# Patient Record
Sex: MALE | Race: WHITE | Employment: UNEMPLOYED | URBAN - METROPOLITAN AREA
[De-identification: names, ages, dates, MRNs, and addresses within clinical notes are randomized per-mention and may not be internally consistent; named-entity substitution may affect disease eponyms.]

---

## 2020-01-22 ENCOUNTER — OFFICE VISIT (OUTPATIENT)
Dept: FAMILY MEDICINE | Facility: CLINIC | Age: 20
End: 2020-01-22

## 2020-01-22 VITALS
WEIGHT: 133.4 LBS | HEIGHT: 65 IN | HEART RATE: 106 BPM | SYSTOLIC BLOOD PRESSURE: 110 MMHG | DIASTOLIC BLOOD PRESSURE: 80 MMHG | BODY MASS INDEX: 22.23 KG/M2 | OXYGEN SATURATION: 95 % | RESPIRATION RATE: 16 BRPM | TEMPERATURE: 99 F

## 2020-01-22 DIAGNOSIS — F32.A DEPRESSION, UNSPECIFIED DEPRESSION TYPE: ICD-10-CM

## 2020-01-22 DIAGNOSIS — F41.9 ANXIETY: ICD-10-CM

## 2020-01-22 DIAGNOSIS — F43.10 PTSD (POST-TRAUMATIC STRESS DISORDER): ICD-10-CM

## 2020-01-22 DIAGNOSIS — F64.9 GENDER DYSPHORIA: Primary | ICD-10-CM

## 2020-01-22 LAB
ALBUMIN SERPL-MCNC: 4.8 MG/DL (ref 3.8–5)
ALP SERPL-CCNC: 89.7 U/L (ref 31.7–110.5)
ALT SERPL-CCNC: 12.6 U/L (ref 0–45)
AST SERPL-CCNC: 16.9 U/L (ref 0–35)
BILIRUB SERPL-MCNC: 0.6 MG/DL (ref 0.2–1.3)
BUN SERPL-MCNC: 18 MG/DL (ref 7–19)
CALCIUM SERPL-MCNC: 9.8 MG/DL (ref 8.5–10.1)
CHLORIDE SERPLBLD-SCNC: 101 MMOL/L (ref 94–109)
CHOLEST SERPL-MCNC: 124.7 MG/DL (ref 0–200)
CHOLEST/HDLC SERPL: 3 {RATIO} (ref 0–5)
CO2 SERPL-SCNC: 31.5 MMOL/L (ref 20–32)
CREAT SERPL-MCNC: 1.3 MG/DL (ref 0.5–1)
GFR SERPL CREATININE-BSD FRML MDRD: 57.4 ML/MIN/1.7 M2
GLUCOSE SERPL-MCNC: 70.2 MG'DL (ref 70–99)
HDLC SERPL-MCNC: 41.2 MG/DL
HEMOGLOBIN: 15 G/DL (ref 11.7–15.7)
LDLC SERPL CALC-MCNC: 57 MG/DL (ref 0–129)
POTASSIUM SERPL-SCNC: 3.9 MMOL/DL (ref 3.3–4.5)
PROT SERPL-MCNC: 7.2 G/DL (ref 6.8–8.8)
SODIUM SERPL-SCNC: 137.8 MMOL/L (ref 132.6–141.4)
TRIGL SERPL-MCNC: 130.8 MG/DL (ref 0–150)
VLDL CHOLESTEROL: 26.2 MG/DL (ref 7–32)

## 2020-01-22 ASSESSMENT — PATIENT HEALTH QUESTIONNAIRE - PHQ9
5. POOR APPETITE OR OVEREATING: NEARLY EVERY DAY
SUM OF ALL RESPONSES TO PHQ QUESTIONS 1-9: 17

## 2020-01-22 ASSESSMENT — ANXIETY QUESTIONNAIRES
7. FEELING AFRAID AS IF SOMETHING AWFUL MIGHT HAPPEN: NEARLY EVERY DAY
2. NOT BEING ABLE TO STOP OR CONTROL WORRYING: NEARLY EVERY DAY
GAD7 TOTAL SCORE: 18
5. BEING SO RESTLESS THAT IT IS HARD TO SIT STILL: SEVERAL DAYS
IF YOU CHECKED OFF ANY PROBLEMS ON THIS QUESTIONNAIRE, HOW DIFFICULT HAVE THESE PROBLEMS MADE IT FOR YOU TO DO YOUR WORK, TAKE CARE OF THINGS AT HOME, OR GET ALONG WITH OTHER PEOPLE: VERY DIFFICULT
6. BECOMING EASILY ANNOYED OR IRRITABLE: NEARLY EVERY DAY
3. WORRYING TOO MUCH ABOUT DIFFERENT THINGS: MORE THAN HALF THE DAYS
1. FEELING NERVOUS, ANXIOUS, OR ON EDGE: NEARLY EVERY DAY

## 2020-01-22 ASSESSMENT — MIFFLIN-ST. JEOR: SCORE: 1373.04
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
## 2020-01-22 NOTE — PATIENT INSTRUCTIONS
Here is the summary from today's visit.    1. Gender dysphoria  We are checking labs today  Will discuss at next visit  Read and bring the consent for feminizing hormones  - Testosterone Total  - Comprehensive Metabolic Panel  - Hemoglobin (HGB)  - Lipid Cascade  - Glucose  - BEHAVIORAL HEALTH REFERRAL ([facility] interal and external)    2. Depression, unspecified depression type  They will call you to give you information/referrals  - BEHAVIORAL HEALTH REFERRAL ([facility] interal and external)      Follow up plan  1 week, to check on labs, continue E and frances    Please call the  at [phone] to schedule an appointment.       Thank you for coming to [facility] today!  Linda Hamilton MD   >>>>> <<<<<  If you had laboratory testing and results need quick action we will call you at # [phone] (home) . If this is not the best number, please call our clinic or stop by the  to update your contact information.  If you need any refills please call your pharmacy and they will contact us. If you need to  your refill at a new pharmacy, please contact the new pharmacy directly. The new pharmacy will help you get your medications transferred faster.   If you have any concerns about today's visit or wish to schedule another appointment, please call our office during normal business hours [phone] (8-5:00 M-F)  If a referral was made to a [facility] and you don't get a call from central scheduling, please call [phone].  If a mammogram was ordered, call [facility] at [phone] to schedule or change your appointment.  If you had an XRay/CT/Ultrasound/MRI ordered, the number is [phone] to schedule or change your radiology appointment.     If you have urgent medical concerns please call [phone] at any time of the day.

## 2020-01-23 ASSESSMENT — ANXIETY QUESTIONNAIRES: GAD7 TOTAL SCORE: 18

## 2020-01-25 LAB — TESTOST SERPL-MCNC: 286 NG/DL (ref 8–60)

## 2020-01-28 ENCOUNTER — TELEPHONE (OUTPATIENT)
Dept: FAMILY MEDICINE | Facility: CLINIC | Age: 20
End: 2020-01-28

## 2020-01-28 ENCOUNTER — OFFICE VISIT (OUTPATIENT)
Dept: FAMILY MEDICINE | Facility: CLINIC | Age: 20
End: 2020-01-28

## 2020-01-28 VITALS
TEMPERATURE: 98 F | BODY MASS INDEX: 19.83 KG/M2 | HEART RATE: 90 BPM | DIASTOLIC BLOOD PRESSURE: 69 MMHG | SYSTOLIC BLOOD PRESSURE: 107 MMHG | OXYGEN SATURATION: 98 % | HEIGHT: 65 IN | WEIGHT: 119 LBS

## 2020-01-28 DIAGNOSIS — R94.4 ABNORMAL RENAL FUNCTION TEST: ICD-10-CM

## 2020-01-28 DIAGNOSIS — F43.10 PTSD (POST-TRAUMATIC STRESS DISORDER): ICD-10-CM

## 2020-01-28 DIAGNOSIS — F64.0 GENDER DYSPHORIA IN ADOLESCENT AND ADULT: Primary | ICD-10-CM

## 2020-01-28 DIAGNOSIS — F33.1 MODERATE EPISODE OF RECURRENT MAJOR DEPRESSIVE DISORDER (H): ICD-10-CM

## 2020-01-28 LAB
BILIRUBIN UR: NEGATIVE MG/DL
BLOOD UR: NEGATIVE MG/DL
CREAT SERPL-MCNC: 1 MG/DL (ref 0.5–1)
CREAT UR-MCNC: 212 MG/DL
GLUCOSE URINE: NEGATIVE
KETONES UR QL: NEGATIVE MG/DL
LEUKOCYTE ESTERASE UR: NEGATIVE
MICROALBUMIN UR-MCNC: 5 MG/L
MICROALBUMIN/CREAT UR: 2.45 MG/G CR (ref 0–25)
NITRITE UR QL STRIP: NEGATIVE MG/DL
PH UR STRIP: 6 [PH] (ref 4.5–8)
PROTEIN UR: NEGATIVE MG/DL
SP GR UR STRIP: 1.02 (ref 1–1.03)
UROBILINOGEN UR STRIP-ACNC: NORMAL E.U./DL

## 2020-01-28 RX ORDER — SPIRONOLACTONE 100 MG/1
50 TABLET, FILM COATED ORAL DAILY
Qty: 15 TABLET | Refills: 0 | Status: SHIPPED | OUTPATIENT
Start: 2020-01-28 | End: 2020-02-18

## 2020-01-28 RX ORDER — ESTRADIOL 2 MG/1
4 TABLET ORAL DAILY
Qty: 60 TABLET | Refills: 0 | Status: SHIPPED | OUTPATIENT
Start: 2020-01-28 | End: 2020-02-18

## 2020-01-28 SDOH — HEALTH STABILITY: MENTAL HEALTH: HOW OFTEN DO YOU HAVE A DRINK CONTAINING ALCOHOL?: NEVER

## 2020-01-28 ASSESSMENT — ANXIETY QUESTIONNAIRES
3. WORRYING TOO MUCH ABOUT DIFFERENT THINGS: MORE THAN HALF THE DAYS
GAD7 TOTAL SCORE: 14
5. BEING SO RESTLESS THAT IT IS HARD TO SIT STILL: MORE THAN HALF THE DAYS
1. FEELING NERVOUS, ANXIOUS, OR ON EDGE: MORE THAN HALF THE DAYS
7. FEELING AFRAID AS IF SOMETHING AWFUL MIGHT HAPPEN: MORE THAN HALF THE DAYS
2. NOT BEING ABLE TO STOP OR CONTROL WORRYING: MORE THAN HALF THE DAYS
IF YOU CHECKED OFF ANY PROBLEMS ON THIS QUESTIONNAIRE, HOW DIFFICULT HAVE THESE PROBLEMS MADE IT FOR YOU TO DO YOUR WORK, TAKE CARE OF THINGS AT HOME, OR GET ALONG WITH OTHER PEOPLE: VERY DIFFICULT
6. BECOMING EASILY ANNOYED OR IRRITABLE: NEARLY EVERY DAY

## 2020-01-28 ASSESSMENT — PATIENT HEALTH QUESTIONNAIRE - PHQ9
5. POOR APPETITE OR OVEREATING: SEVERAL DAYS
SUM OF ALL RESPONSES TO PHQ QUESTIONS 1-9: 12

## 2020-01-28 ASSESSMENT — MIFFLIN-ST. JEOR: SCORE: 1308.78

## 2020-01-28 NOTE — PATIENT INSTRUCTIONS
Here is the summary from today's visit.    1. Gender dysphoria in adolescent and adult  - estradiol (ESTRACE) 2 MG tablet; Take 2 tablets (4 mg) by mouth daily  Dispense: 60 tablet; Refill: 0  - spironolactone (ALDACTONE) 100 MG tablet; Take 0.5 tablets (50 mg) by mouth daily  Dispense: 15 tablet; Refill: 0    2. Abnormal renal function test  - Protein timed urine with Creat Ratio  - Urinalysis, Micro If ([facility])  - Creatinine (s) ([facility])    3. Mild episode of recurrent major depressive disorder (H)  4. PTSD (post-traumatic stress disorder)  They will call your cell   - BEHAVIORAL HEALTH REFERRAL ([facility] interal and external)    Follow up plan  Please make a clinic appointment for follow up with me (LINDA BAHENA) in 30 days.    Please call the  at [phone] to schedule an appointment.       Thank you for coming to [facility] today!  Linda Bahena MD   >>>>> <<<<<  If you had laboratory testing and results need quick action we will call you at # [phone] (home) . If this is not the best number, please call our clinic or stop by the  to update your contact information.  If you need any refills please call your pharmacy and they will contact us. If you need to  your refill at a new pharmacy, please contact the new pharmacy directly. The new pharmacy will help you get your medications transferred faster.   If you have any concerns about today's visit or wish to schedule another appointment, please call our office during normal business hours [phone] (8-5:00 M-F)  If a referral was made to a [facility] and you don't get a call from central scheduling, please call [phone].  If a mammogram was ordered, call [facility] at [phone] to schedule or change your appointment.  If you had an XRay/CT/Ultrasound/MRI ordered, the number is [phone] to schedule or change your radiology appointment.     If you have urgent medical  concerns please call [phone] at any time of the day.

## 2020-01-28 NOTE — PROGRESS NOTES
Preceptor Attestation:   Patient seen, evaluated and discussed with the resident. I have verified the content of the note, which accurately reflects my assessment of the patient and the plan of care.   Supervising Physician:  Mady Medina MD

## 2020-01-28 NOTE — TELEPHONE ENCOUNTER
Per my consultation with Dr. Hamilton, patient is not actually interested in psychotherapy at this time which was not clear from the referrals that were placed. She is only interested in ADHD testing at this time.     Referral for adult ADHD evaluation:  Please provide patient with the following information.    [facility]  [address]  [place]  [address]  [phone]    [facility]  [address]  [address]  [place] [address]  [phone]    [facility]  [address]  [place]  [address]  [phone]    [facility]  [address] [place], [place]  [phone]    [facility]  [address]  [place], [place] [id number] [phone]      Annmarie Hodge, PhD,   Behavioral Health Fellow
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
## 2020-01-29 ASSESSMENT — ANXIETY QUESTIONNAIRES: GAD7 TOTAL SCORE: 14

## 2020-01-31 PROBLEM — F64.0 GENDER DYSPHORIA IN ADOLESCENT AND ADULT: Chronic | Status: ACTIVE | Noted: 2020-01-28

## 2020-01-31 PROBLEM — F32.A DEPRESSION, UNSPECIFIED DEPRESSION TYPE: Status: ACTIVE | Noted: 2020-01-31

## 2020-01-31 PROBLEM — F43.10 PTSD (POST-TRAUMATIC STRESS DISORDER): Chronic | Status: ACTIVE | Noted: 2020-01-31

## 2020-01-31 PROBLEM — F32.A DEPRESSION, UNSPECIFIED DEPRESSION TYPE: Chronic | Status: ACTIVE | Noted: 2020-01-31

## 2020-01-31 PROBLEM — F43.10 PTSD (POST-TRAUMATIC STRESS DISORDER): Status: ACTIVE | Noted: 2020-01-31

## 2020-01-31 PROBLEM — F41.9 ANXIETY: Status: ACTIVE | Noted: 2020-01-31

## 2020-02-03 NOTE — TELEPHONE ENCOUNTER
Informed patient of the listed locations. Patient was recommended to follow up with the locations for insurance verification. If any other questions or concerns to call clinic and follow up with PCP here at the clinic.      Annette Sandoval CMA  [facility] Coordinator

## 2020-02-04 PROBLEM — F33.1 MODERATE EPISODE OF RECURRENT MAJOR DEPRESSIVE DISORDER (H): Chronic | Status: ACTIVE | Noted: 2020-02-04

## 2020-02-04 PROBLEM — F41.9 ANXIETY: Chronic | Status: ACTIVE | Noted: 2020-01-31

## 2020-02-04 PROBLEM — F33.1 MODERATE EPISODE OF RECURRENT MAJOR DEPRESSIVE DISORDER (H): Status: ACTIVE | Noted: 2020-02-04

## 2020-02-04 SDOH — SOCIAL STABILITY: SOCIAL INSECURITY
WITHIN THE LAST YEAR, HAVE YOU BEEN KICKED, HIT, SLAPPED, OR OTHERWISE PHYSICALLY HURT BY YOUR PARTNER OR EX-PARTNER?: NO

## 2020-02-04 SDOH — SOCIAL STABILITY: SOCIAL NETWORK: ARE YOU MARRIED, WIDOWED, DIVORCED, SEPARATED, NEVER MARRIED, OR LIVING WITH A PARTNER?: LIVING WITH PARTNER

## 2020-02-04 SDOH — SOCIAL STABILITY: SOCIAL NETWORK
DO YOU BELONG TO ANY CLUBS OR ORGANIZATIONS SUCH AS CHURCH GROUPS UNIONS, FRATERNAL OR ATHLETIC GROUPS, OR SCHOOL GROUPS?: NO

## 2020-02-04 SDOH — SOCIAL STABILITY: SOCIAL INSECURITY
WITHIN THE LAST YEAR, HAVE TO BEEN RAPED OR FORCED TO HAVE ANY KIND OF SEXUAL ACTIVITY BY YOUR PARTNER OR EX-PARTNER?: NO

## 2020-02-04 SDOH — HEALTH STABILITY: MENTAL HEALTH
STRESS IS WHEN SOMEONE FEELS TENSE, NERVOUS, ANXIOUS, OR CAN'T SLEEP AT NIGHT BECAUSE THEIR MIND IS TROUBLED. HOW STRESSED ARE YOU?: TO SOME EXTENT

## 2020-02-04 SDOH — SOCIAL STABILITY: SOCIAL INSECURITY: WITHIN THE LAST YEAR, HAVE YOU BEEN HUMILIATED OR EMOTIONALLY ABUSED IN OTHER WAYS BY YOUR PARTNER OR EX-PARTNER?: NO

## 2020-02-04 SDOH — SOCIAL STABILITY: SOCIAL NETWORK: HOW OFTEN DO YOU ATTENT MEETINGS OF THE CLUB OR ORGANIZATION YOU BELONG TO?: NEVER

## 2020-02-04 SDOH — SOCIAL STABILITY: SOCIAL INSECURITY: WITHIN THE LAST YEAR, HAVE YOU BEEN AFRAID OF YOUR PARTNER OR EX-PARTNER?: NO

## 2020-02-18 DIAGNOSIS — F64.0 GENDER DYSPHORIA IN ADOLESCENT AND ADULT: ICD-10-CM

## 2020-02-18 RX ORDER — ESTRADIOL 2 MG/1
TABLET ORAL
Qty: 60 TABLET | Refills: 0 | Status: SHIPPED | OUTPATIENT
Start: 2020-02-18 | End: 2020-02-25

## 2020-02-18 RX ORDER — SPIRONOLACTONE 100 MG/1
TABLET, FILM COATED ORAL
Qty: 15 TABLET | Refills: 0 | Status: SHIPPED | OUTPATIENT
Start: 2020-02-18 | End: 2020-02-25

## 2020-02-25 ENCOUNTER — OFFICE VISIT (OUTPATIENT)
Dept: FAMILY MEDICINE | Facility: CLINIC | Age: 20
End: 2020-02-25

## 2020-02-25 VITALS
HEIGHT: 66 IN | TEMPERATURE: 98.2 F | BODY MASS INDEX: 20.15 KG/M2 | SYSTOLIC BLOOD PRESSURE: 114 MMHG | RESPIRATION RATE: 16 BRPM | WEIGHT: 125.4 LBS | DIASTOLIC BLOOD PRESSURE: 69 MMHG | OXYGEN SATURATION: 95 % | HEART RATE: 99 BPM

## 2020-02-25 DIAGNOSIS — F41.9 ANXIETY: Chronic | ICD-10-CM

## 2020-02-25 DIAGNOSIS — F64.0 GENDER DYSPHORIA IN ADOLESCENT AND ADULT: Primary | ICD-10-CM

## 2020-02-25 DIAGNOSIS — Z00.00 HEALTHCARE MAINTENANCE: ICD-10-CM

## 2020-02-25 DIAGNOSIS — F33.1 MODERATE EPISODE OF RECURRENT MAJOR DEPRESSIVE DISORDER (H): Chronic | ICD-10-CM

## 2020-02-25 RX ORDER — SPIRONOLACTONE 100 MG/1
50 TABLET, FILM COATED ORAL DAILY
Qty: 15 TABLET | Refills: 0 | Status: SHIPPED | OUTPATIENT
Start: 2020-03-10 | End: 2020-05-12

## 2020-02-25 RX ORDER — ESTRADIOL 2 MG/1
4 TABLET ORAL DAILY
Qty: 60 TABLET | Refills: 0 | Status: SHIPPED | OUTPATIENT
Start: 2020-03-10 | End: 2020-05-12

## 2020-02-25 ASSESSMENT — PATIENT HEALTH QUESTIONNAIRE - PHQ9: 5. POOR APPETITE OR OVEREATING: MORE THAN HALF THE DAYS

## 2020-02-25 ASSESSMENT — ANXIETY QUESTIONNAIRES
IF YOU CHECKED OFF ANY PROBLEMS ON THIS QUESTIONNAIRE, HOW DIFFICULT HAVE THESE PROBLEMS MADE IT FOR YOU TO DO YOUR WORK, TAKE CARE OF THINGS AT HOME, OR GET ALONG WITH OTHER PEOPLE: VERY DIFFICULT
6. BECOMING EASILY ANNOYED OR IRRITABLE: NEARLY EVERY DAY
3. WORRYING TOO MUCH ABOUT DIFFERENT THINGS: SEVERAL DAYS
5. BEING SO RESTLESS THAT IT IS HARD TO SIT STILL: MORE THAN HALF THE DAYS
1. FEELING NERVOUS, ANXIOUS, OR ON EDGE: MORE THAN HALF THE DAYS
2. NOT BEING ABLE TO STOP OR CONTROL WORRYING: MORE THAN HALF THE DAYS
7. FEELING AFRAID AS IF SOMETHING AWFUL MIGHT HAPPEN: SEVERAL DAYS
GAD7 TOTAL SCORE: 13

## 2020-02-25 ASSESSMENT — MIFFLIN-ST. JEOR: SCORE: 1360.56

## 2020-02-25 NOTE — PROGRESS NOTES
DENISE     Yuki is a 19 year old individual that uses pronouns She/Her/Hers/Herself that presents today for follow up of:  feminizing hormone therapy.     Gender identity: female    Any special concerns today?    - travelling to visit family for 2 weeks, very happy about it, going with her partner (March 11-25 going to [place])  - scheduled ADHD evaluation(3/30), which helped decrease anxiety  - talked to behavioral health, open to starting therapy after ADHD evaluation     Partner in the room, expressed concern about patient's low libido. Patient not bothered by this.     On hormones?  YES +++   Shot day of the week? Not applicable-taking pills/patch/gel      Due for labs?  No        Refills of meds needed?  Yes    Gender affirmation is being sought in these other ways: pronouns, preferred name, dress    ---    History reviewed. No pertinent surgical history.    Patient Active Problem List   Diagnosis     Gender dysphoria in adolescent and adult     PTSD (post-traumatic stress disorder)     Anxiety     Moderate episode of recurrent major depressive disorder (H)       Current Outpatient Medications   Medication Sig Dispense Refill     [START ON 3/10/2020] estradiol (ESTRACE) 2 MG tablet Take 2 tablets (4 mg) by mouth daily 60 tablet 0     [START ON 3/10/2020] spironolactone (ALDACTONE) 100 MG tablet Take 0.5 tablets (50 mg) by mouth daily 15 tablet 0       History   Smoking Status     Never Smoker   Smokeless Tobacco     Never Used        No Known Allergies    Problem, Medication and Allergy Lists were reviewed and are current..         Review of Systems:        General    Fat redistribution: no    Weight change: no HEENT    Voice change: no     Cardiovascular (CV)    Chest Pains: no    Shortness of breath: no Chest    Decreased exercise tolerance:  no    Breast changes/development: no     Gastrointestinal (GI)    Abdominal pain: no    Change in appetite: no Skin    Acne or oily skin: no    Change in hair: no  "    Genitourinary ()    Abnormal vaginal bleeding: not applicable     Decreased spontaneous erections: no    Change in libido: no    New sexual partners: no Musculoskeletal    Leg pain or swelling: no     Psychiatric (Psych)    Depression: YES- see above    Anxiety/Panic: YES- see above    Mood:  \"okay\"                    Physical Exam:     Vitals:    02/25/20 1103   BP: 114/69   Pulse: 99   Resp: 16   Temp: 98.2  F (36.8  C)   TempSrc: Oral   SpO2: 95%   Weight: 56.9 kg (125 lb 6.4 oz)   Height: 1.676 m (5' 6\")     BMI= Body mass index is 20.24 kg/m .   Wt Readings from Last 10 Encounters:   02/25/20 56.9 kg (125 lb 6.4 oz) (46 %)*   01/28/20 54 kg (119 lb) (34 %)*   01/22/20 60.5 kg (133 lb 6.4 oz) (61 %)*     * Growth percentiles are based on CDC (Girls, 2-20 Years) data.     Appearance: Female appearance and dress    General: very pleasant, well appearing.    HEENT: No signs of trauma. No rhinorrhea. Moist membranes.   Eyes: Conjunctivae and sclerae are normal. Pupils are equal.   Neck: Normal range of motion.    Resp: No respiratory distress. Normal breath sounds throughout without rales/wheezing.   CV: normal RRR, no murmurs. Normal capillary refill.  MSK: Normal range of motion. No obvious deformity.  Neuro: The patient is alert and interactive. Speech normal. No gross focal symptoms.  Skin: No lesion or sign of trauma noted on exposed skin.   Psych: Blunted/Flat and Anxious/Nervous, behavior is normal. Does smile sporadically. Denies SI          Labs:   Results from last visit:  Office Visit on 01/28/2020   Component Date Value Ref Range Status     Specific Gravity Urine 01/28/2020 1.025  1.005 - 1.030 Final     pH Urine 01/28/2020 6.0  4.5 - 8.0 Final     Leukocyte Esterase UR 01/28/2020 Negative  NEGATIVE Final     Nitrite Urine 01/28/2020 Negative  NEGATIVE mg/dL Final     Protein UR 01/28/2020 Negative  NEGATIVE mg/dL Final     Glucose Urine 01/28/2020 Negative  NEGATIVE Final     Ketones Urine " 01/28/2020 Negative  NEGATIVE mg/dL Final     Urobilinogen mg/dL 01/28/2020 0.2 E.U./dL  0.2 E.U./dL E.U./dL Final     Bilirubin UR 01/28/2020 Negative  NEGATIVE mg/dL Final     Blood UR 01/28/2020 Negative  NEGATIVE mg/dL Final     Creatinine 01/28/2020 1.0  0.5 - 1.0 mg/dL Final     Creatinine Urine 01/28/2020 212  mg/dL Final     Albumin Urine mg/L 01/28/2020 5  mg/L Final     Albumin Urine mg/g Cr 01/28/2020 2.45  0 - 25 mg/g Cr Final       Assessment and Plan     Gender dysphoria in adolescent and adult  Continue hormones, no significant changes, but knows that it takes time, and we will adjust doses as needed. No changes today.  -     estradiol (ESTRACE) 2 MG tablet; Take 2 tablets (4 mg) by mouth daily  -     spironolactone (ALDACTONE) 100 MG tablet; Take 0.5 tablets (50 mg) by mouth daily    Anxiety  Moderate episode of recurrent major depressive disorder  PHQ-9 AND JAMISON-7 given, not entered into chart yet.   Pending ADHD evaluation  Will be visiting family soon, looking forward to that  Denies SI  Open to counseling after ADHD evaluation  Not interested in medications at this time    Healthcare maintenance  -     TDAP VACCINE (BOOSTRIX)  -     HPV9 (Gardasil 9 )  -     CHICKEN POX VACCINE,LIVE,SUBCUT    Contraception:   not needed    Counselled patient about controlled substances: No    Follow up:  Follow up in 1 month.  [facility] by [facility]  Questions were elicited and answered.     Linda Hamilton MD

## 2020-02-25 NOTE — PROGRESS NOTES
Preceptor Attestation:   Patient seen, evaluated and discussed with the resident. I have verified the content of the note, which accurately reflects my assessment of the patient and the plan of care.   Supervising Physician:  Lilli Minor MD

## 2020-02-26 PROBLEM — R94.4 ABNORMAL RENAL FUNCTION TEST: Status: RESOLVED | Noted: 2020-01-28 | Resolved: 2020-02-26

## 2020-03-04 ASSESSMENT — PATIENT HEALTH QUESTIONNAIRE - PHQ9: SUM OF ALL RESPONSES TO PHQ QUESTIONS 1-9: 15

## 2020-03-05 ASSESSMENT — ANXIETY QUESTIONNAIRES: GAD7 TOTAL SCORE: 13

## 2020-03-11 ENCOUNTER — HEALTH MAINTENANCE LETTER (OUTPATIENT)
Age: 20
End: 2020-03-11

## 2020-05-11 ENCOUNTER — TELEPHONE (OUTPATIENT)
Dept: FAMILY MEDICINE | Facility: CLINIC | Age: 20
End: 2020-05-11

## 2020-05-11 NOTE — TELEPHONE ENCOUNTER
Called and spoke with pt about video visit with. Dr. Hamilton on 5/12. Pt downloaded [employer] Touchpoint and video test was successful using smartphone and SMS #[phone].     Rosina Jean-Baptiste CMA

## 2020-05-12 ENCOUNTER — VIRTUAL VISIT (OUTPATIENT)
Dept: FAMILY MEDICINE | Facility: CLINIC | Age: 20
End: 2020-05-12

## 2020-05-12 DIAGNOSIS — F64.0 GENDER DYSPHORIA IN ADOLESCENT AND ADULT: ICD-10-CM

## 2020-05-12 RX ORDER — SPIRONOLACTONE 100 MG/1
50 TABLET, FILM COATED ORAL DAILY
Qty: 45 TABLET | Refills: 0 | Status: SHIPPED | OUTPATIENT
Start: 2020-05-12 | End: 2020-07-06

## 2020-05-12 RX ORDER — ESTRADIOL 2 MG/1
4 TABLET ORAL DAILY
Qty: 180 TABLET | Refills: 0 | Status: SHIPPED | OUTPATIENT
Start: 2020-05-12 | End: 2020-07-06

## 2020-05-12 ASSESSMENT — PATIENT HEALTH QUESTIONNAIRE - PHQ9
SUM OF ALL RESPONSES TO PHQ QUESTIONS 1-9: 9
5. POOR APPETITE OR OVEREATING: SEVERAL DAYS

## 2020-05-12 ASSESSMENT — ANXIETY QUESTIONNAIRES
GAD7 TOTAL SCORE: 9
2. NOT BEING ABLE TO STOP OR CONTROL WORRYING: SEVERAL DAYS
6. BECOMING EASILY ANNOYED OR IRRITABLE: NEARLY EVERY DAY
3. WORRYING TOO MUCH ABOUT DIFFERENT THINGS: SEVERAL DAYS
5. BEING SO RESTLESS THAT IT IS HARD TO SIT STILL: MORE THAN HALF THE DAYS
1. FEELING NERVOUS, ANXIOUS, OR ON EDGE: NOT AT ALL
IF YOU CHECKED OFF ANY PROBLEMS ON THIS QUESTIONNAIRE, HOW DIFFICULT HAVE THESE PROBLEMS MADE IT FOR YOU TO DO YOUR WORK, TAKE CARE OF THINGS AT HOME, OR GET ALONG WITH OTHER PEOPLE: SOMEWHAT DIFFICULT
7. FEELING AFRAID AS IF SOMETHING AWFUL MIGHT HAPPEN: SEVERAL DAYS

## 2020-05-12 NOTE — PROGRESS NOTES
"Family Medicine Video Visit Note  Yuki is being evaluated via a billable video visit.           Video Visit Consent     Patient was verbally read the following and verbal consent was obtained.  \"Video visits are billed at different rates depending on your insurance coverage. During this emergency period, for some insurers they may be billed the same as an in-person visit.  Please reach out to your insurance provider with any questions.  If during the course of the call the physician/provider feels a telephone visit is not appropriate, you will not be charged for this service.\"     (Name person giving consent:  Patient   Date verbal consent given:  5/12/2020  Time verbal consent given:  1:13 PM)    Patient would like the video invitation sent by: Text to cell phone: [phone]     Chief Complaint   Patient presents with     RECHECK     follow up meds            HPI     Video Start Time: 1:24 PM    Yuki presents to clinic today for HRT follow-up and depression follow-up     Patient was in [place] visiting family. Originally planned for 2 weeks, ended up staying over a month due to COVID-19. This was very stressful since her step-father became very irritable at everyone, but she feels she is doing better now for depression and anxiety, due to being \"back home\" - [place]. Denies any suicidal ideation recently.     Reports she is in a good relationship with partner. They have been quarantining for 2 weeks after flying back home from [place].     She continues to take 4 mg of estrogen daily and 50 mg of spironolactone and is happy with that dose, denies any side effects or missing medication frequently.     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     Current Outpatient Medications   Medication Sig Dispense Refill     estradiol (ESTRACE) 2 MG tablet Take 2 tablets (4 mg) by mouth daily 60 tablet 0     spironolactone (ALDACTONE) 100 MG tablet Take 0.5 tablets " (50 mg) by mouth daily 15 tablet 0     No Known Allergies         Review of Systems:     General    Fat redistribution: no    Weight change: no HEENT    Voice change: no     Cardiovascular (CV)    Chest Pains: no    Shortness of breath: no Chest    Decreased exercise tolerance:  no    Breast changes/development: YES     Gastrointestinal (GI)    Abdominal pain: no    Change in appetite: no Skin    Acne or oily skin: not applicable    Change in hair: YES     Genitourinary ()    Abnormal vaginal bleeding: not applicable     Decreased spontaneous erections: no    Change in libido: no    New sexual partners: no Musculoskeletal    Leg pain or swelling: no     Psychiatric (Psych)    Mood: OK    Anxiety/Panic: no     PHQ 1/28/2020 2/25/2020 5/12/2020   PHQ-9 Total Score 12 15 9   Q9: Thoughts of better off dead/self-harm past 2 weeks Not at all Several days Not at all     JAMISON-7 SCORE 1/28/2020 2/25/2020 5/12/2020   Total Score 14 13 9            Physical Exam:     GENERAL: Healthy, alert and no distress  EYES: Eyes grossly normal to inspection.  No discharge or erythema, or obvious scleral/conjunctival abnormalities.  HENT: Normal cephalic/atraumatic.  External ears, nose and mouth without ulcers or lesions.  No nasal drainage visible.  NECK: No asymmetry, visible masses or scars  RESP: No audible wheeze, cough, or visible cyanosis.  No visible retractions or increased work of breathing.    SKIN: Visible skin clear. No significant rash, abnormal pigmentation or lesions.  NEURO: Cranial nerves grossly intact.  Mentation and speech appropriate for age.  PSYCH: Mentation appears normal, affect normal/bright, judgement and insight intact, normal speech and appearance well-groomed.    Results from last visit:  Office Visit on 01/28/2020   Component Date Value Ref Range Status     Specific Gravity Urine 01/28/2020 1.025  1.005 - 1.030 Final     pH Urine 01/28/2020 6.0  4.5 - 8.0 Final     Leukocyte Esterase UR 01/28/2020  Negative  NEGATIVE Final     Nitrite Urine 01/28/2020 Negative  NEGATIVE mg/dL Final     Protein UR 01/28/2020 Negative  NEGATIVE mg/dL Final     Glucose Urine 01/28/2020 Negative  NEGATIVE Final     Ketones Urine 01/28/2020 Negative  NEGATIVE mg/dL Final     Urobilinogen mg/dL 01/28/2020 0.2 E.U./dL  0.2 E.U./dL E.U./dL Final     Bilirubin UR 01/28/2020 Negative  NEGATIVE mg/dL Final     Blood UR 01/28/2020 Negative  NEGATIVE mg/dL Final     Creatinine 01/28/2020 1.0  0.5 - 1.0 mg/dL Final     Creatinine Urine 01/28/2020 212  mg/dL Final     Albumin Urine mg/L 01/28/2020 5  mg/L Final     Albumin Urine mg/g Cr 01/28/2020 2.45  0 - 25 mg/g Cr Final           Assessment and Plan   1. Gender dysphoria in adolescent and adult  Happy with changes, denies side effects. Will continue current dose. F/u in 3 months.   - estradiol (ESTRACE) 2 MG tablet; Take 2 tablets (4 mg) by mouth daily  Dispense: 180 tablet; Refill: 0  - spironolactone (ALDACTONE) 100 MG tablet; Take 0.5 tablets (50 mg) by mouth daily  Dispense: 45 tablet; Refill: 0    2. Depression  3. Anxiety  PHQ-9 and JAMISON-7 as above, doing better than 2 months ago. Continues to decline therapy or medications. Wants to wait to do ADHD evaluation. Referral placed previously, will discuss appointment with  if not done at f/u.     Refilled medications that would be required in the next 3 months.     After Visit Information: Patient chose to view AVS via [facility]    F/u in 1-2 months.     Video-Visit Details    Type of service:  Video Visit    Video End Time (time video stopped): 1:34 PM    Originating Location (pt. Location): Home    Distant Location (provider location):  [facility]     Mode of Communication:  Video Conference via Bernice Hamilton MD  I precepted today with Dr. Medina

## 2020-05-12 NOTE — PROGRESS NOTES
Video Preceptor Attestation:   Patient seen and discussed with the resident. I have verified the content of the note, which accurately reflects my assessment of the patient and the plan of care.   Supervising Physician:  Mady Medina MD

## 2020-05-13 ASSESSMENT — ANXIETY QUESTIONNAIRES: GAD7 TOTAL SCORE: 9

## 2020-05-14 NOTE — PATIENT INSTRUCTIONS
- schedule ADHD evaluation  - follow-up in ~6 weeks, sooner if you have any concerns    Stay safe,   Dr. Hamilton

## 2020-07-06 ENCOUNTER — OFFICE VISIT (OUTPATIENT)
Dept: FAMILY MEDICINE | Facility: CLINIC | Age: 20
End: 2020-07-06

## 2020-07-06 VITALS
OXYGEN SATURATION: 96 % | WEIGHT: 121 LBS | BODY MASS INDEX: 19.44 KG/M2 | DIASTOLIC BLOOD PRESSURE: 69 MMHG | HEIGHT: 66 IN | HEART RATE: 100 BPM | SYSTOLIC BLOOD PRESSURE: 107 MMHG

## 2020-07-06 DIAGNOSIS — H69.93 DYSFUNCTION OF BOTH EUSTACHIAN TUBES: ICD-10-CM

## 2020-07-06 DIAGNOSIS — F64.0 GENDER DYSPHORIA IN ADOLESCENT AND ADULT: Primary | ICD-10-CM

## 2020-07-06 LAB
ALBUMIN SERPL-MCNC: 4.8 MG/DL (ref 3.8–5)
ALP SERPL-CCNC: 71.7 U/L (ref 31.7–110.5)
ALT SERPL-CCNC: 8.2 U/L (ref 0–45)
AST SERPL-CCNC: 3.9 U/L (ref 0–35)
BILIRUB SERPL-MCNC: 0.4 MG/DL (ref 0.2–1.3)
BUN SERPL-MCNC: 10 MG/DL (ref 7–19)
CALCIUM SERPL-MCNC: 9.6 MG/DL (ref 8.5–10.1)
CHLORIDE SERPLBLD-SCNC: 98.9 MMOL/L (ref 94–109)
CHOLEST SERPL-MCNC: 139.7 MG/DL (ref 0–200)
CHOLEST/HDLC SERPL: 3.4 {RATIO} (ref 0–5)
CO2 SERPL-SCNC: 29.6 MMOL/L (ref 20–32)
CREAT SERPL-MCNC: 1 MG/DL (ref 0.5–1)
GFR SERPL CREATININE-BSD FRML MDRD: 76.8 ML/MIN/1.7 M2
GLUCOSE SERPL-MCNC: 94.2 MG'DL (ref 70–99)
HDLC SERPL-MCNC: 41.2 MG/DL
HEMOGLOBIN: 14.5 G/DL (ref 11.7–15.7)
LDLC SERPL CALC-MCNC: 73 MG/DL (ref 0–129)
POTASSIUM SERPL-SCNC: 3.8 MMOL/L (ref 3.3–4.5)
PROT SERPL-MCNC: 7.5 G/DL (ref 6.8–8.8)
SODIUM SERPL-SCNC: 133.9 MMOL/L (ref 132.6–141.4)
TRIGL SERPL-MCNC: 127.7 MG/DL (ref 0–150)
VLDL CHOLESTEROL: 25.5 MG/DL (ref 7–32)

## 2020-07-06 RX ORDER — ESTRADIOL 2 MG/1
6 TABLET ORAL DAILY
Qty: 270 TABLET | Refills: 0 | Status: SHIPPED | OUTPATIENT
Start: 2020-07-06 | End: 2020-10-07

## 2020-07-06 RX ORDER — FLUTICASONE PROPIONATE 50 MCG
1 SPRAY, SUSPENSION (ML) NASAL DAILY
Qty: 9.9 ML | Refills: 1 | Status: SHIPPED | OUTPATIENT
Start: 2020-07-06 | End: 2020-11-18

## 2020-07-06 RX ORDER — SPIRONOLACTONE 100 MG/1
100 TABLET, FILM COATED ORAL DAILY
Qty: 90 TABLET | Refills: 0 | Status: SHIPPED | OUTPATIENT
Start: 2020-07-06 | End: 2020-10-07

## 2020-07-06 ASSESSMENT — ANXIETY QUESTIONNAIRES
3. WORRYING TOO MUCH ABOUT DIFFERENT THINGS: NEARLY EVERY DAY
2. NOT BEING ABLE TO STOP OR CONTROL WORRYING: MORE THAN HALF THE DAYS
7. FEELING AFRAID AS IF SOMETHING AWFUL MIGHT HAPPEN: MORE THAN HALF THE DAYS
IF YOU CHECKED OFF ANY PROBLEMS ON THIS QUESTIONNAIRE, HOW DIFFICULT HAVE THESE PROBLEMS MADE IT FOR YOU TO DO YOUR WORK, TAKE CARE OF THINGS AT HOME, OR GET ALONG WITH OTHER PEOPLE: VERY DIFFICULT
GAD7 TOTAL SCORE: 16
6. BECOMING EASILY ANNOYED OR IRRITABLE: NEARLY EVERY DAY
5. BEING SO RESTLESS THAT IT IS HARD TO SIT STILL: MORE THAN HALF THE DAYS
1. FEELING NERVOUS, ANXIOUS, OR ON EDGE: MORE THAN HALF THE DAYS

## 2020-07-06 ASSESSMENT — MIFFLIN-ST. JEOR: SCORE: 1340.6

## 2020-07-06 ASSESSMENT — PATIENT HEALTH QUESTIONNAIRE - PHQ9
5. POOR APPETITE OR OVEREATING: MORE THAN HALF THE DAYS
SUM OF ALL RESPONSES TO PHQ QUESTIONS 1-9: 17

## 2020-07-06 NOTE — PROGRESS NOTES
[facility] visit    Assessment and Plan     Yuki is a 19 year old transgender female who presents for evaluation of: Follow Up (F/U Depression )     Markel was seen today for follow up.    Diagnoses and all orders for this visit:    Gender dysphoria in adolescent and adult  She is doing well, happy with changes so far, no side effects, but interested in increasing dose. I think this is appropriate, but we will also check labs as she is due. If results require changes in medications, will discuss via MyChart.   -     estradiol (ESTRACE) 2 MG tablet; Take 3 tablets (6 mg) by mouth daily (take 2 tabs in the AM, and 1 tab in the evening)  -     spironolactone (ALDACTONE) 100 MG tablet; Take 1 tablet (100 mg) by mouth daily  -     Testosterone Total  -     Comprehensive Metabolic Panel  -     Hemoglobin (HGB)  -     Lipid Cascade    Dysfunction of both eustachian tubes  No evidence of infection, minimal cerumen noted. Will do Flonase, avoid decongestants. May try debrox if she is concerned about cerumen accumulation.  -     fluticasone (FLONASE) 50 MCG/ACT nasal spray; Spray 1 spray into both nostrils daily  -     carbamide peroxide (DEBROX) 6.5 % otic solution; Place 5 drops into both ears daily as needed for other (ear wax clearing)      Follow-up in 3 months with laboratory re-check if normal today.    Options for treatment and follow-up care were reviewed with the patient/caregivers. They engaged in the decision making process and verbalized understanding of the options discussed and agreed with the final plan.    Medications Discontinued During This Encounter   Medication Reason     estradiol (ESTRACE) 2 MG tablet Reorder     spironolactone (ALDACTONE) 100 MG tablet Reorder     Linda Hamilton MD  Family Medicine Resident [facility], PGY-3  Phone: [phone]     Subjective      History obtained from patient and chart review     Yuki is a 19 year old transgender female that uses pronouns She/Her/Hers/Herself  "that presents today for  Chief Complaint   Patient presents with     Follow Up     F/U Depression      Any special concerns today?  Wants to increase estrogen and spironolactone dose    On hormones?  YES, started  Due for labs?  Yes       Refills of meds needed?  Yes  Gender affirmation is being sought in these other ways: pronouns, preferred name, dress    PHQ 2/25/2020 5/12/2020 7/6/2020   PHQ-9 Total Score 15 9 17   Q9: Thoughts of better off dead/self-harm past 2 weeks Several days Not at all Several days     JAMISON-7 SCORE 2/25/2020 5/12/2020 7/6/2020   Total Score 13 9 16     Admits to passive suicidal ideation, denies plan or access to guns. Feels supported by partner. Continues to decline therapy/counseling as she wants to get ADHD test first. Declining medications at this time. She has not called back to schedule evaluation, was supposed to hear from them once COVID-19 restrictions were lifted.     Adherence and Exercise  Medication side effects: no  How often is a medication missed? Never    Ear problem: feels like she has to \"pop\" her ears all the time. Denies decreased hearing but they both feel full and sound is kind of muffled. No pain. Has seen ENT in the past for this, was told she had a lot of cerumen. No recent ear infections, no swimming, no discharge. No URI symptoms.     Patient is an established patient of this clinic, so I updated the problem, medication and allergy lists, as well as past, surgical, family and social history.      ROS    General    Fat redistribution: YES    Weight change: no HEENT    Voice change: YES     Cardiovascular (CV)    Chest Pains: no    Shortness of breath: no Chest    Decreased exercise tolerance:  no    Breast changes/development: YES     Gastrointestinal (GI)    Abdominal pain: no    Change in appetite: no Skin    Acne or oily skin: no    Change in hair: YES     Genitourinary ()    Decreased spontaneous erections: YES    Change in libido: no    New sexual " "partners: no Musculoskeletal    Leg pain or swelling: no     Psychiatric (Psych)    Depression: YES    Anxiety/Panic: YES    Mood:  \"good\"     PHQ 2/25/2020 5/12/2020 7/6/2020   PHQ-9 Total Score 15 9 17   Q9: Thoughts of better off dead/self-harm past 2 weeks Several days Not at all Several days     JAMISON-7 SCORE 2/25/2020 5/12/2020 7/6/2020   Total Score 13 9 16       Objective     Vital signs  /69 (BP Location: Left arm, Patient Position: Sitting, Cuff Size: Adult Regular)   Pulse 100   Ht 1.676 m (5' 6\")   Wt 54.9 kg (121 lb)   SpO2 96%   Breastfeeding No   BMI 19.53 kg/m      Vitals were reviewed and were normal     General: very pleasant, thin, feminine appearance    HEENT: No signs of trauma. Ears clear bilaterally, small amount of cerum on right. No rhinorrhea. Moist mucous membranes.   Eyes: Conjunctivae and sclerae are normal. Pupils are equal.   Neck: Supple  Resp: No respiratory distress.  CV: normal RRR.. Normal capillary refill.  MSK: No leg swelling or pain.  Neuro: The patient is alert and interactive. Speech normal. No gross focal symptoms.  Skin: No lesion or sign of trauma noted on exposed skin.   Psych: Affect is appropriate and concordant with mood, behavior is normal.     Results  Pending         "

## 2020-07-06 NOTE — PROGRESS NOTES
Preceptor Attestation:    Patient seen and evaluated in person. I discussed the patient with the resident. I have verified the content of the note, which accurately reflects my assessment of the patient and the plan of care.   Supervising Physician:  Shannon Ram MD.

## 2020-07-07 ASSESSMENT — ANXIETY QUESTIONNAIRES: GAD7 TOTAL SCORE: 16

## 2020-07-08 LAB — TESTOST SERPL-MCNC: 340 NG/DL (ref 8–60)

## 2020-07-12 PROBLEM — H69.93 DYSFUNCTION OF BOTH EUSTACHIAN TUBES: Status: ACTIVE | Noted: 2020-07-12

## 2020-10-05 DIAGNOSIS — F64.0 GENDER DYSPHORIA IN ADOLESCENT AND ADULT: Primary | ICD-10-CM

## 2020-10-05 NOTE — TELEPHONE ENCOUNTER

## 2020-10-07 RX ORDER — SPIRONOLACTONE 100 MG/1
100 TABLET, FILM COATED ORAL DAILY
Qty: 30 TABLET | Refills: 0 | Status: SHIPPED | OUTPATIENT
Start: 2020-10-07 | End: 2020-10-09

## 2020-10-07 RX ORDER — ESTRADIOL 2 MG/1
6 TABLET ORAL DAILY
Qty: 90 TABLET | Refills: 0 | Status: SHIPPED | OUTPATIENT
Start: 2020-10-07 | End: 2020-10-09

## 2020-10-09 ENCOUNTER — MYC REFILL (OUTPATIENT)
Dept: FAMILY MEDICINE | Facility: CLINIC | Age: 20
End: 2020-10-09

## 2020-10-09 DIAGNOSIS — F64.0 GENDER DYSPHORIA IN ADOLESCENT AND ADULT: ICD-10-CM

## 2020-10-10 RX ORDER — ESTRADIOL 2 MG/1
6 TABLET ORAL DAILY
Qty: 90 TABLET | Refills: 0 | Status: SHIPPED | OUTPATIENT
Start: 2020-10-10 | End: 2020-11-18

## 2020-10-10 RX ORDER — SPIRONOLACTONE 100 MG/1
100 TABLET, FILM COATED ORAL DAILY
Qty: 30 TABLET | Refills: 0 | Status: SHIPPED | OUTPATIENT
Start: 2020-10-10 | End: 2020-11-18

## 2020-11-06 DIAGNOSIS — F64.0 GENDER DYSPHORIA IN ADOLESCENT AND ADULT: ICD-10-CM

## 2020-11-06 LAB
ALBUMIN SERPL-MCNC: 3.8 G/DL (ref 3.4–5)
ALP SERPL-CCNC: 67 U/L (ref 40–150)
ALT SERPL W P-5'-P-CCNC: 12 U/L (ref 0–70)
ANION GAP SERPL CALCULATED.3IONS-SCNC: 8 MMOL/L (ref 3–14)
AST SERPL W P-5'-P-CCNC: 14 U/L (ref 0–45)
BILIRUB SERPL-MCNC: 0.4 MG/DL (ref 0.2–1.3)
BUN SERPL-MCNC: 12 MG/DL (ref 7–30)
CALCIUM SERPL-MCNC: 8.9 MG/DL (ref 8.5–10.1)
CHLORIDE SERPL-SCNC: 108 MMOL/L (ref 94–109)
CO2 SERPL-SCNC: 22 MMOL/L (ref 20–32)
CREAT SERPL-MCNC: 0.93 MG/DL (ref 0.66–1.25)
GFR SERPL CREATININE-BSD FRML MDRD: >90 ML/MIN/{1.73_M2}
GLUCOSE SERPL-MCNC: 72 MG/DL (ref 70–99)
HGB BLD-MCNC: 14.2 G/DL (ref 13.3–17.7)
POTASSIUM SERPL-SCNC: 3.8 MMOL/L (ref 3.4–5.3)
PROT SERPL-MCNC: 7.3 G/DL (ref 6.8–8.8)
SODIUM SERPL-SCNC: 138 MMOL/L (ref 133–144)

## 2020-11-06 PROCEDURE — 80053 COMPREHEN METABOLIC PANEL: CPT | Performed by: FAMILY MEDICINE

## 2020-11-06 PROCEDURE — 36415 COLL VENOUS BLD VENIPUNCTURE: CPT | Performed by: FAMILY MEDICINE

## 2020-11-06 PROCEDURE — 99000 SPECIMEN HANDLING OFFICE-LAB: CPT | Performed by: FAMILY MEDICINE

## 2020-11-06 PROCEDURE — 84403 ASSAY OF TOTAL TESTOSTERONE: CPT | Performed by: FAMILY MEDICINE

## 2020-11-06 PROCEDURE — 85018 HEMOGLOBIN: CPT | Performed by: FAMILY MEDICINE

## 2020-11-06 NOTE — LETTER
November 11, 2020      Markel Channing  [address]  [place] [address]        Dear ,    We are writing to inform you of your test results.    Your test results fall within the expected range(s) or remain unchanged from previous results.  Please continue with current treatment plan.    Resulted Orders   Hemoglobin (HGB)   Result Value Ref Range    Hemoglobin 14.2 13.3 - 17.7 g/dL   Comprehensive Metabolic Panel   Result Value Ref Range    Sodium 138 133 - 144 mmol/L    Potassium 3.8 3.4 - 5.3 mmol/L    Chloride 108 94 - 109 mmol/L    Carbon Dioxide 22 20 - 32 mmol/L    Anion Gap 8 3 - 14 mmol/L    Glucose 72 70 - 99 mg/dL    Urea Nitrogen 12 7 - 30 mg/dL    Creatinine 0.93 0.66 - 1.25 mg/dL    GFR Estimate >90 >60 mL/min/[1.73_m2]      Comment:      Non  GFR Calc  Starting 12/18/2018, serum creatinine based estimated GFR (eGFR) will be   calculated using the Chronic Kidney Disease Epidemiology Collaboration   (CKD-EPI) equation.      GFR Estimate If Black >90 >60 mL/min/[1.73_m2]      Comment:       GFR Calc  Starting 12/18/2018, serum creatinine based estimated GFR (eGFR) will be   calculated using the Chronic Kidney Disease Epidemiology Collaboration   (CKD-EPI) equation.      Calcium 8.9 8.5 - 10.1 mg/dL    Bilirubin Total 0.4 0.2 - 1.3 mg/dL    Albumin 3.8 3.4 - 5.0 g/dL    Protein Total 7.3 6.8 - 8.8 g/dL    Alkaline Phosphatase 67 40 - 150 U/L    ALT 12 0 - 70 U/L    AST 14 0 - 45 U/L   Testosterone total   Result Value Ref Range    Testosterone Total 47 (L) 240 - 950 ng/dL      Comment:      This test was developed and its performance characteristics determined by the   [facility].   It has not been cleared or approved by the FDA. The laboratory is regulated   under CLIA as qualified to perform high-complexity testing. This test is used   for clinical purposes. It should not be regarded as  investigational or for   research.         If you have any questions or concerns, please call the clinic at the number listed above.       Sincerely,        [facility]

## 2020-11-10 LAB — TESTOST SERPL-MCNC: 47 NG/DL (ref 240–950)

## 2020-11-18 ENCOUNTER — OFFICE VISIT (OUTPATIENT)
Dept: FAMILY MEDICINE | Facility: CLINIC | Age: 20
End: 2020-11-18
Payer: COMMERCIAL

## 2020-11-18 VITALS
WEIGHT: 110.2 LBS | RESPIRATION RATE: 16 BRPM | OXYGEN SATURATION: 96 % | TEMPERATURE: 98.7 F | HEIGHT: 64 IN | BODY MASS INDEX: 18.82 KG/M2 | HEART RATE: 94 BPM

## 2020-11-18 DIAGNOSIS — F64.0 GENDER DYSPHORIA IN ADOLESCENT AND ADULT: Primary | Chronic | ICD-10-CM

## 2020-11-18 DIAGNOSIS — F41.9 ANXIETY: Chronic | ICD-10-CM

## 2020-11-18 DIAGNOSIS — H69.93 DYSFUNCTION OF BOTH EUSTACHIAN TUBES: ICD-10-CM

## 2020-11-18 DIAGNOSIS — F33.1 MODERATE EPISODE OF RECURRENT MAJOR DEPRESSIVE DISORDER (H): Chronic | ICD-10-CM

## 2020-11-18 PROCEDURE — 99214 OFFICE O/P EST MOD 30 MIN: CPT | Mod: GC | Performed by: FAMILY MEDICINE

## 2020-11-18 RX ORDER — FLUTICASONE PROPIONATE 50 MCG
1 SPRAY, SUSPENSION (ML) NASAL DAILY
Qty: 9.9 ML | Refills: 1 | Status: SHIPPED | OUTPATIENT
Start: 2020-11-18 | End: 2020-12-28

## 2020-11-18 RX ORDER — FLUOXETINE 10 MG/1
CAPSULE ORAL
Qty: 49 CAPSULE | Refills: 1 | Status: SHIPPED | OUTPATIENT
Start: 2020-11-18 | End: 2020-12-08

## 2020-11-18 RX ORDER — SPIRONOLACTONE 100 MG/1
100 TABLET, FILM COATED ORAL DAILY
Qty: 90 TABLET | Refills: 0 | Status: SHIPPED | OUTPATIENT
Start: 2020-11-18 | End: 2021-02-26

## 2020-11-18 RX ORDER — ESTRADIOL 2 MG/1
6 TABLET ORAL DAILY
Qty: 270 TABLET | Refills: 0 | Status: SHIPPED | OUTPATIENT
Start: 2020-11-18 | End: 2021-02-22

## 2020-11-18 ASSESSMENT — MIFFLIN-ST. JEOR: SCORE: 1420.86

## 2020-11-18 NOTE — PROGRESS NOTES
Preceptor Attestation:   Patient seen, evaluated and discussed with the resident. I have verified the content of the note, which accurately reflects my assessment of the patient and the plan of care.   Supervising Physician:  Kevin Solis MD

## 2020-11-18 NOTE — PROGRESS NOTES
[facility] visit    Assessment and Plan     Yuki is a 20 year old transgender female / male-to-female who presents for evaluation of: RECHECK (HRT)       Gender dysphoria in adolescent and adult  Continue medications at current dose.   -     spironolactone (ALDACTONE) 100 MG tablet; Take 1 tablet (100 mg) by mouth daily  -     estradiol (ESTRACE) 2 MG tablet; Take 3 tablets (6 mg) by mouth daily (take 2 tabs in the AM, and 1 tab in the evening)    Moderate episode of recurrent major depressive disorder  Anxiety  Worsening depression, with anxiety, worse while girlfriend was in chem dep treatment, when she wanted to hang herself in the living room, and online friends stopped her. Somewhat improved since, no current suicidal thoughts/plan. No access to guns, and feels supported at home.   Interested in starting therapy and medications. Reviewed risk of increased suicidal thoughts, advised her to tell her girlfriend and friends that she is starting medications and to monitor for that as well. Knows to call clinic for questions, call 911 or go to the ED if having suicidal thoughts or worsening sad mood.   -     BEHAVIORAL HEALTH REFERRAL ([facility] external)  -     FLUoxetine (PROZAC) 10 MG capsule; Take 1 capsule (10 mg) by mouth daily for 7 days, THEN 2 capsules (20 mg) daily for 21 days.    Dysfunction of both eustachian tubes  -     fluticasone (FLONASE) 50 MCG/ACT nasal spray; Spray 1 spray into both nostrils daily      Follow-up with me in 2 weeks, can push to 4 weeks if seeing therapist/.    Options for treatment and follow-up care were reviewed with the patient/caregivers. They engaged in the decision making process and verbalized understanding of the options discussed and agreed with the final plan.    Medications Discontinued During This Encounter   Medication Reason     fluticasone (FLONASE) 50 MCG/ACT nasal spray      estradiol (ESTRACE) 2 MG tablet      spironolactone (ALDACTONE) 100 MG  "tablet        Linda Hamilton MD  Family Medicine Resident [facility], PGY-3  Phone: [phone]      Subjective      History obtained from patient and chart review     Markel Snell is a 20 year old adult, who presents with:  Chief Complaint   Patient presents with     RECHECK     HRT     Depressed, more than usual, but not bad for \"herself\"  Tried to kill herself by hunging herself from the living room, but friend talked her out of it, said she'd call the police if she did anything  Doing better since partner Shantell returned home after inpatient chem dep treatment last month. Relationship is good, feels supported. Open to doing therapy, starting medications. Has never been on them before.   Reports suicidal thoughts, mainly about hanging herself in her living room. Luckily some \"online\" friends talked her out of it 2-3 weeks ago. Since then, doing a bit better, no recent suicidal thoughts. No access to guns.     Dad has diagnosis of depression, but did not believe in medications.     Taking estrogen and frances w/o concerns. Would like to have larger breasts. Otherwise feels pretty good with changes. Would like more information on progesterone.   Recent labs as noted below.    Patient is an established patient of this clinic, so I updated the problem, medication and allergy lists, as well as past, surgical, family and social history.      ROS  7-point ROS negative except as described above.    Objective     Vital signs  Pulse 94   Temp 98.7  F (37.1  C) (Oral)   Resp 16   Ht 1.626 m (5' 4\")   Wt 50 kg (110 lb 3.2 oz)   SpO2 96%   BMI 18.92 kg/m      Vitals were reviewed and were normal     General: very pleasant and well appearing.    HEENT: No signs of trauma. No rhinorrhea. Moist mucous membranes.   Eyes: Conjunctivae and sclerae are normal. Pupils are symmetric.   Neck: Supple.  Resp: No respiratory distress. Normal breath sounds throughout without rales/wheezing.   CV: normal RRR, no murmurs. Normal capillary " refill. No leg swelling.  MSK: Normal range of motion. No obvious deformity.  Neuro: The patient is alert and interactive. Speech appears normal. No gross focal symptoms. Ambulatory.  Skin: No rash or sign of trauma noted on exposed skin.   Psych: Affect is blunted, though smiles at times. Well groomed. Poor eye contact, soft voice. Speech coherent. Mood depressed, admits to suicidal ideation, mostly passive. Denies plan, feels safe at home with partner, able to contract for safety. No homicidality or hallucinations        Results  Results from last visit  Orders Only on 11/06/2020   Component Date Value Ref Range Status     Hemoglobin 11/06/2020 14.2  13.3 - 17.7 g/dL Final     Sodium 11/06/2020 138  133 - 144 mmol/L Final     Potassium 11/06/2020 3.8  3.4 - 5.3 mmol/L Final     Chloride 11/06/2020 108  94 - 109 mmol/L Final     Carbon Dioxide 11/06/2020 22  20 - 32 mmol/L Final     Anion Gap 11/06/2020 8  3 - 14 mmol/L Final     Glucose 11/06/2020 72  70 - 99 mg/dL Final     Urea Nitrogen 11/06/2020 12  7 - 30 mg/dL Final     Creatinine 11/06/2020 0.93  0.66 - 1.25 mg/dL Final     GFR Estimate 11/06/2020 >90  >60 mL/min/[1.73_m2] Final     GFR Estimate If Black 11/06/2020 >90  >60 mL/min/[1.73_m2] Final     Calcium 11/06/2020 8.9  8.5 - 10.1 mg/dL Final     Bilirubin Total 11/06/2020 0.4  0.2 - 1.3 mg/dL Final     Albumin 11/06/2020 3.8  3.4 - 5.0 g/dL Final     Protein Total 11/06/2020 7.3  6.8 - 8.8 g/dL Final     Alkaline Phosphatase 11/06/2020 67  40 - 150 U/L Final     ALT 11/06/2020 12  0 - 70 U/L Final     AST 11/06/2020 14  0 - 45 U/L Final     Testosterone Total 11/06/2020 47* 240 - 950 ng/dL Final

## 2020-11-19 ENCOUNTER — TELEPHONE (OUTPATIENT)
Dept: PSYCHOLOGY | Facility: CLINIC | Age: 20
End: 2020-11-19

## 2020-11-19 ENCOUNTER — OFFICE VISIT (OUTPATIENT)
Dept: FAMILY MEDICINE | Facility: CLINIC | Age: 20
End: 2020-11-19
Payer: COMMERCIAL

## 2020-11-19 VITALS — OXYGEN SATURATION: 99 % | HEART RATE: 90 BPM | TEMPERATURE: 98.7 F

## 2020-11-19 DIAGNOSIS — Z20.822 SUSPECTED 2019 NOVEL CORONAVIRUS INFECTION: Primary | ICD-10-CM

## 2020-11-19 PROCEDURE — 99213 OFFICE O/P EST LOW 20 MIN: CPT | Mod: CS | Performed by: STUDENT IN AN ORGANIZED HEALTH CARE EDUCATION/TRAINING PROGRAM

## 2020-11-19 PROCEDURE — U0003 INFECTIOUS AGENT DETECTION BY NUCLEIC ACID (DNA OR RNA); SEVERE ACUTE RESPIRATORY SYNDROME CORONAVIRUS 2 (SARS-COV-2) (CORONAVIRUS DISEASE [COVID-19]), AMPLIFIED PROBE TECHNIQUE, MAKING USE OF HIGH THROUGHPUT TECHNOLOGIES AS DESCRIBED BY CMS-2020-01-R: HCPCS | Performed by: FAMILY MEDICINE

## 2020-11-19 ASSESSMENT — ENCOUNTER SYMPTOMS
ARTHRALGIAS: 0
WEAKNESS: 0
DIARRHEA: 0
VOMITING: 0
COUGH: 0
HEADACHES: 0
CONSTIPATION: 0
ABDOMINAL PAIN: 0
SORE THROAT: 1
SHORTNESS OF BREATH: 0
DYSURIA: 0
BLOOD IN STOOL: 0
FEVER: 0
MYALGIAS: 0
WHEEZING: 0
RHINORRHEA: 1
NAUSEA: 0
UNEXPECTED WEIGHT CHANGE: 0
HEMATURIA: 0

## 2020-11-19 NOTE — PROGRESS NOTES
HPI       Markel Snell is a 20 year old  who presents for   Chief Complaint   Patient presents with     scratchy throat     2-3 days of throat feeling tighter scratchy but no pain. , some congestion and runny nose. No SOB, no cough. No lose of smell or taste.     Was around someone at dinner 4 days ago and that person was + and found out 2 days prior.       Problem, Medication and Allergy Lists were reviewed and updated if needed..     Patient is an established patient of this clinic..         Review of Systems:   Review of Systems   Constitutional: Negative for fever and unexpected weight change.   HENT: Positive for rhinorrhea and sore throat.    Respiratory: Negative for cough, shortness of breath and wheezing.    Cardiovascular: Negative for chest pain.   Gastrointestinal: Negative for abdominal pain, blood in stool, constipation, diarrhea, nausea and vomiting.   Genitourinary: Negative for dysuria and hematuria.   Musculoskeletal: Negative for arthralgias and myalgias.   Neurological: Negative for weakness and headaches.            Physical Exam:     Vitals:    11/19/20 1543   Pulse: 90   Temp: 98.7  F (37.1  C)   SpO2: 99%     There is no height or weight on file to calculate BMI.  Vitals were reviewed and were normal     Physical Exam  Constitutional:       General: She is not in acute distress.     Appearance: She is well-developed.   HENT:      Head: Normocephalic and atraumatic.   Eyes:      Conjunctiva/sclera: Conjunctivae normal.   Cardiovascular:      Rate and Rhythm: Normal rate and regular rhythm.      Heart sounds: Normal heart sounds.   Pulmonary:      Effort: Pulmonary effort is normal. No respiratory distress.   Abdominal:      General: Bowel sounds are normal. There is no distension.      Palpations: Abdomen is soft.      Tenderness: There is no abdominal tenderness.   Musculoskeletal: Normal range of motion.   Skin:     General: Skin is warm.      Capillary Refill: Capillary refill takes  less than 2 seconds.      Findings: No rash.   Neurological:      Mental Status: She is alert and oriented to person, place, and time.           Results:   Results are ordered and pending    Assessment and Plan        1. Suspected 2019 novel coronavirus infection  Patient tested for asymptomatic exposure. Expect results 48-72 hours. Reviewed AVS with info on quarantine, what to do for positive result, etc.     If COVID negative suspect viral pharyngitis. Reviewed symptomatic care.   .  - Symptomatic COVID-19 Virus (Coronavirus) by PCR       There are no discontinued medications.    Options for treatment and follow-up care were reviewed with the patient. Markel Snell  engaged in the decision making process and verbalized understanding of the options discussed and agreed with the final plan.    Casa Godinez MD

## 2020-11-19 NOTE — PROGRESS NOTES
Preceptor Attestation:   Patient seen and discussed with the resident. Assessment and plan reviewed with resident and agreed upon.   Supervising Physician:  DO [facility]

## 2020-11-19 NOTE — TELEPHONE ENCOUNTER
Mental Health Referral:  Please schedule with anyone of us that has soonest availability - Dr. Sanchez, Dr. Edwards, or Dr. Campos.     Please let patient know this is for primary care behavioral health services.  Therapists at our clinic are able to see patients for 8-12 sessions (video/phone). If further assistance is needed, they will help the patient connect with ongoing services in the community.    Check with the patient if they are able to do a video visit.  They will need access to either a smartphone or a laptop with camera/microphone.  If they can do a video visit, please schedule as a video visit.  If they do not have the technology to do a video visit, please schedule as a telephone visit. For either visit, please put the phone number or email in the appt notes that the provider is supposed to call or send the visit invite.  There are some instructions regarding how the video visits work for patients below. This can be copied/pasted into a [employer] message if the patient would like more information.      If you are unable to reach the patient after two phone attempts, please send a letter and close the encounter.      Thank you!

## 2020-11-19 NOTE — PATIENT INSTRUCTIONS
"COVID-19 Testing Follow Up Instructions  If you have symptoms or known exposure/contact to someone with positive COVID 19 results, please stay home and isolate as you await your own results.     Test results are typically delivered within 48-72 hours. You will be notified by an [facility] nurse of any positive results and a public health representative will also reach out to provide more information. Negative (no COVID)  results are available via [employer].  We will also attempt to contact you by phone with your results.  If you haven t heard from us within 5 days then please contact us at [phone] and ask for the Nurse.    How can I protect others?  If you have symptoms (fever, cough, body aches or trouble breathing):  Stay home and away from others (self-isolate) until:  At least 10 days have passed since your symptoms started. And   You've had no fever--and no medicine that reduces fever--for 1 full day (24 hours). And   Your other symptoms have resolved (gotten better).  If you don't show symptoms, but testing showed that you have COVID-19:  Stay home and away from others (self-isolate) until at least 10 days have passed since the date of your first positive COVID-19 test.  If you have been exposed to COVID-19:  Stay home and away from others (quarantine) for 14 days even if you have tested negative for COVID.  The amount of time for COVID to make you sick can be anywhere from 2-14 days.  Remember that you can be sick without symptoms.  You can be re-tested close to day 14 to be sure you are not passing on the virus.    During this time  Stay in your own room, even for meals. Use your own bathroom if you can.  Stay away from others in your home. No hugging, kissing or shaking hands. No visitors.  Don't go to work, school or anywhere else.  Clean \"high touch\" surfaces often (doorknobs, counters, handles). Use household cleaning spray or wipes. You'll find a full list of  on the EPA website: " www.epa.gov/pesticide-registration/list-n-disinfectants-use-against-sars-cov-2.  Cover your mouth and nose with a mask or other face covering to avoid spreading germs.  Wash your hands and face often. Use soap and water.  Caregivers in these groups are at risk for severe illness due to COVID-19:  People 65 years and older  People who live in a nursing home or long-term care facility  People with chronic disease (lung, heart, cancer, diabetes, kidney, liver, obesity or immune issues)    Caregivers should wear a mask as well and wear gloves while washing dishes, handling laundry and cleaning bedrooms and bathrooms.  Use caution when washing and drying laundry: Don't shake dirty laundry and use the warmest water setting that you can.  For more tips on managing your health at home, go to www.cdc.gov/coronavirus/2019-ncov/downloads/10Things.pdf.    How can I take care of myself at home?  Get lots of rest. Drink extra fluids (unless a doctor has told you not to).  Take Tylenol (acetaminophen) for fever or pain. If you have liver or kidney problems, ask your family doctor if it's okay to take Tylenol.     Adults can take either:  650 mg (two 325 mg pills) every 4 to 6 hours, or   1,000 mg (two 500 mg pills) every 8 hours as needed.  Note: Don't take more than 3,000 mg in one day. Acetaminophen is found in many medicines (both prescribed and over-the-counter medicines). Read all labels to be sure you don't take too much.   For children, check the Tylenol bottle for the right dose. The dose is based on the child's age or weight.   Ibuprofen/Advil and other  Non-steroidal Anti Inflammatory Medicines like Aleve/Naproxen are often fine to take as well.  They have not been shown to make COVID worse or cause organ damage.  Be careful if you have kidney trouble, stomach or heart issues, and when in doubt, call your doctor.  If you have other health problems (like cancer, heart failure, an organ transplant or severe kidney  disease): Call your specialty clinic if you don't feel better in the next 2 days.  Know when to call 911. Emergency warning signs include:  Trouble breathing or shortness of breath  Pain or pressure in the chest that doesn't go away  Feeling confused like you haven't felt before, or not being able to wake up  Bluish-colored lips or face  Where can I get more information?  [facility] - About COVID-19: "[email or web address]  CDC - What to Do If You're Sick: www.cdc.gov/coronavirus/2019-ncov/about/steps-when-sick.html  CDC - Ending Home Isolation: www.cdc.gov/coronavirus/2019-ncov/hcp/disposition-in-home-patients.html  CDC - Caring for Someone: www.cdc.gov/coronavirus/2019-ncov/if-you-are-sick/care-for-someone.html  [facility] - Interim Guidance for Hospital Discharge to Home: www.health.[facility].mn.us/diseases/coronavirus/hcp/hospdischarge.pdf  [facility] clinical trials (COVID-19 research studies): clinicalaffairs.[facility]-clinical-trials  Below are the COVID-19 hotlines at the [facility] of Health ([facility]). Interpreters are available.  For health questions: Call 534-074-5689 or 1-609.200.6480 (7 a.m. to 7 p.m.)  For questions about schools and childcare: Call 597-824-9233 or 1-120.964.5500 (7 a.m. to 7 p.m.)    For informational purposes only. Not to replace the advice of your health care provider. Clinically reviewed by the Infection Prevention Team. Copyright   2020 [facility]. All rights reserved. [employer] [id number] - REV 08/04/20.    If you have any questions please contact [facility] 24/7 at [phone]

## 2020-11-20 LAB
SARS-COV-2 RNA SPEC QL NAA+PROBE: NOT DETECTED
SPECIMEN SOURCE: NORMAL

## 2020-11-22 ENCOUNTER — HOSPITAL ENCOUNTER (EMERGENCY)
Facility: CLINIC | Age: 20
Discharge: HOME | End: 2020-11-22
Attending: EMERGENCY MEDICINE | Admitting: EMERGENCY MEDICINE
Payer: COMMERCIAL

## 2020-11-22 VITALS
TEMPERATURE: 99.4 F | HEIGHT: 64 IN | OXYGEN SATURATION: 96 % | BODY MASS INDEX: 18.78 KG/M2 | WEIGHT: 110 LBS | RESPIRATION RATE: 18 BRPM | HEART RATE: 96 BPM | DIASTOLIC BLOOD PRESSURE: 78 MMHG | SYSTOLIC BLOOD PRESSURE: 110 MMHG

## 2020-11-22 DIAGNOSIS — R45.851 SUICIDAL IDEATION: ICD-10-CM

## 2020-11-22 LAB
AMPHETAMINES UR QL SCN: NEGATIVE
BARBITURATES UR QL: NEGATIVE
BENZODIAZ UR QL: NEGATIVE
CANNABINOIDS UR QL SCN: NEGATIVE
COCAINE UR QL: NEGATIVE
OPIATES UR QL SCN: NEGATIVE
PCP UR QL SCN: NEGATIVE

## 2020-11-22 PROCEDURE — 99285 EMERGENCY DEPT VISIT HI MDM: CPT | Mod: 25

## 2020-11-22 PROCEDURE — 90791 PSYCH DIAGNOSTIC EVALUATION: CPT

## 2020-11-22 PROCEDURE — 80307 DRUG TEST PRSMV CHEM ANLYZR: CPT | Performed by: EMERGENCY MEDICINE

## 2020-11-22 ASSESSMENT — ENCOUNTER SYMPTOMS
COUGH: 0
SHORTNESS OF BREATH: 0
UNEXPECTED WEIGHT CHANGE: 0
FEVER: 0

## 2020-11-22 ASSESSMENT — MIFFLIN-ST. JEOR: SCORE: 1419.96

## 2020-11-22 NOTE — ED PROVIDER NOTES
History     Chief Complaint:  Suicidal      HPI   Yuki Snell is a 20 year old adult who presents suicidal. The patient reported that she started taking Fluoxetine 2 days ago and since then has been having more intense suicidal thoughts. She was first going to hang herself with a rope but her girlfriend took it away from her. Then she was googling tall buildings near her with the intent to jump off. The patient did go to the top of one of these buildings but was talked down by one of her friends on the phone. The police were called to her location and she was brought to the ED for evaluation on an ROBERTO hold. The patient's last suicidal gesture was attempting to overdose on pills approximately 2 months ago, she was not seen at that time. Yuki does not currently have a therapist, the last time she worked with one was 1-2 years ago. She thinks that her mental state now is similar to how it was when she went to the therapist. She denies any physical complaints including and self injuries, fever, cough, shortness of breath, chest pain or unexpected weight changes. She has no history of mental health hospitalization.     Allergies:  The patient has no known drug allergies.    Medications:    Estradiol   Spironolactone   Fluoxetine     Past Medical History:    Dysfunction of both eustachian tubes   Recurrent major depressive disorder   PTSD   Anxiety   Gender dysphoria     Past Surgical History:    Tooth extraction     Family History:    Breast cancer   Substance abuse   ADD     Social History:  Tobacco use: Never  Alcohol use: Never  Drug use: Marijuana, LSD   PCP: Linda Hamilton  Marital Status:  Single [1]     Review of Systems   Constitutional: Negative for fever and unexpected weight change.   Respiratory: Negative for cough and shortness of breath.    Psychiatric/Behavioral: Positive for suicidal ideas. Negative for self-injury.   All other systems reviewed and are negative.      Physical Exam     Patient  "Vitals for the past 24 hrs:   BP Temp Temp src Pulse Resp SpO2 Height Weight   11/22/20 0530 134/68 -- -- 98 16 97 % -- --   11/22/20 0226 (!) 142/72 99.4  F (37.4  C) Temporal 111 16 97 % 1.626 m (5' 4\") 49.9 kg (110 lb)       Physical Exam  Eyes:  Sclera white; Pupils are equal and round  ENT:    External ears and nares normal  CV:  Rate as above with regular rhythm   Resp:  Breath sounds clear and equal bilaterally  GI:  Abdomen is soft, non-tender, non-distended  MS:  Moves all extremities  Skin:  Warm and dry  Neuro:  Speech is normal and fluent. No apparent deficit.  Psych:  Looking down at hands during most of encounter      Emergency Department Course   Laboratory:  Drug abuse screen 77: pending      Emergency Department Course:  Nursing notes and vitals reviewed. (0232) I performed an exam of the patient as documented above.     IV inserted. Medicine administered as documented above. Urine sample collected. This was sent to the lab for further testing, results above.    (0523) I rechecked the patient and discussed the results of her workup thus far.     (0700) The care of this patient was signed out to my partner Dr. Rosales pending DEC evaluation and final dispositioning.     Impression & Plan      Medical Decision Making:  Markel Snell is a 20 year old adult who is here for evaluation of increasing suicidal thoughts with some steps to potentially follow through on that plan. She does not have any symptoms or findings on exam to make me suspect and underlying infectious, metabolic or substance induced etiology of her symptoms and blood work is not indicated. DEC assessment is pending at this time. I fel that she would benefit from an increase in therapy and my partner will follow up on their specific recommendations on the most appropriate way to achieve this.     Diagnosis:    ICD-10-CM    1. Suicidal ideation  R45.851        Disposition:  Signed out to Dr. Hemanth Thomas Disclosure:  I, Heide Lopez, am " serving as a scribe on 11/22/2020 at 3:35 AM to personally document services performed by Mer Alonso MD based on my observations and the provider's statements to me.     Heide Lopez  11/22/2020   [facility] EMERGENCY DEPT       Mer Alonso MD  11/22/20 0712

## 2020-11-22 NOTE — ED NOTES
Bed: [facility]  Expected date:   Expected time:   Means of arrival:   Comments:  [age]F suicidal eta 10

## 2020-11-22 NOTE — ED TRIAGE NOTES
Pt brought in by EMS on  Hold. Earlier this evening at home pt had found rope and was going to hang herself. Her girlfriend took the rope away and the pt then left the apartment and did a google search for highest building in [place], went to a parking garage and stood on the top, but spoke to a friend who convinced pt that the fall would most likely not kill her and would just paralyze her. Police were called and pt was brought in for assessment.

## 2020-11-22 NOTE — ED AVS SNAPSHOT
[facility] Emergency Dept  [address] [address]  Phone: [phone]  Fax: [phone]                                    Markel Snell   MRN: [id number]    Department: [facility] Emergency Dept   Date of Visit: 11/22/2020           After Visit Summary Signature Page    I have received my discharge instructions, and my questions have been answered. I have discussed any challenges I see with this plan with the nurse or doctor.    ..........................................................................................................................................  Patient/Patient Representative Signature      ..........................................................................................................................................  Patient Representative Print Name and Relationship to Patient    ..................................................               ................................................  Date                                   Time    ..........................................................................................................................................  Reviewed by Signature/Title    ...................................................              ..............................................  Date                                               Time          22EPIC Rev 08/18

## 2020-11-22 NOTE — ED PROVIDER NOTES
Briefly, patient is a 20-year-old male to female individual who presents emergency department with increasing depression and suicidal thoughts.  Please see Dr. Alonso's note for full details.  Patient was seen and evaluated by DEC and then reevaluated by myself.  We agree at this time that the patient can safely be managed as an outpatient.  We put together a safety plan and outpatient follow-up appointments which was handed to the patient at the time of discharge.  We also collected collateral information from the patient's girlfriend Shantell who agrees with our plan and believes that she can keep her safe at home.  When I reevaluated her she is not having ongoing suicidal thoughts and states that she is feels hopeful about the plan going forward.  I did instruct her that the emergency department is always a safe place where she can return to if she ever has increasing thoughts about suicidality.     Blake Saxena MD  11/22/20 0258

## 2020-11-24 ENCOUNTER — VIRTUAL VISIT (OUTPATIENT)
Dept: PSYCHOLOGY | Facility: CLINIC | Age: 20
End: 2020-11-24
Payer: COMMERCIAL

## 2020-11-24 DIAGNOSIS — F43.10 PTSD (POST-TRAUMATIC STRESS DISORDER): ICD-10-CM

## 2020-11-24 DIAGNOSIS — F41.9 ANXIETY DISORDER, UNSPECIFIED TYPE: ICD-10-CM

## 2020-11-24 DIAGNOSIS — F33.2 SEVERE EPISODE OF RECURRENT MAJOR DEPRESSIVE DISORDER, WITHOUT PSYCHOTIC FEATURES (H): Primary | ICD-10-CM

## 2020-11-24 PROCEDURE — 90834 PSYTX W PT 45 MINUTES: CPT | Mod: U7 | Performed by: STUDENT IN AN ORGANIZED HEALTH CARE EDUCATION/TRAINING PROGRAM

## 2020-11-24 NOTE — PROGRESS NOTES
Preceptor Attestation:  I have reviewed and agree with the behavioral health fellow's documentation for this video/phone visit.  I did not see the patient.  Supervising Clinical Psychologist:  Alycia Campos PSYD LP

## 2020-11-24 NOTE — PROGRESS NOTES
Telemedicine Visit: The patient's condition can be safely assessed and treated via synchronous audio and visual telemedicine encounter.      Reason for Telemedicine Visit: Covid-19 restrictions    Originating Site (Patient Location): Patient's home    Distant Site (Provider Location): Provider Remote Setting    Consent:  The patient/guardian has verbally consented to: the potential risks and benefits of telemedicine (video visit) versus in person care; bill my insurance or make self-payment for services provided; and responsibility for payment of non-covered services.     Mode of Communication:  Video Conference via [employer]    As the provider I attest to compliance with applicable laws and regulations related to telemedicine.    Start time: 8:30 am  Stop time: 9:10 am    Emergency contact: Gilma desouzaiend - [phone]  Closest Emergency Room: [facility]  Location at time of call: Home    Behavioral Health Progress Note    Client Legal Name:  Markel Snell   Client Preferred Name:  Yuki   Service Type:  Individual  Length of Visit:  40 minutes  Attendees:  patient    Identifying Information and Presenting Problem:  Yuki is a 20 year old American adult who is being seen for problematic symptoms of depression and suicidal ideation.    Treatment Objective(s) Addressed in This Session:  First visit - informed consent, rapport building and information gathering    Progress on / Status of Treatment Objective(s) / Homework:  N/A - first visit    PHQ-9 SCORE 2/25/2020 5/12/2020 7/6/2020   PHQ-9 Total Score 15 9 17       JAMISON-7 SCORE 2/25/2020 5/12/2020 7/6/2020   Total Score 13 9 16       Topics Discussed/Interventions Provided:  Yuki was referred to behavioral health services by Dr. Hamilton due to worsening depression and suicidal ideation. Yuki reported that she was at the ED two days ago due to suicidal ideation. She had plans to jump off a building near her home. She went to a tall  "parking garage with plans to jump when her friends talked her out of it. They said that she'd be more likely to get seriously hurt rather than die. Yuki returned home with girlfriend, but then escaped out the back door and returned to parking garage with plans to jump \"head first\" to ensure death rather than injury. Was talked out of this by girlfriend. However, friends and girlfriend still called for a wellness check and police found her on her way back home and took her to the hospital.     Friends, girlfriend, and mom are supportive. Described that she was in a \"really crazy\" headspace. Does not want to die. She is applying to [employer] to start in January. She wants to study history. No previous attempts. History of self-harm - cutting/scratching (last time one month ago). Still has compulsion to hurt herself - will bend her toes to the point of hurting but notes that this is \"tame.\" Since ED, has had thoughts of wanting to die but is engaging in active coping and following safety plan. Distraction is helping, rather than \"let thoughts fester.\"     Provided empathic support and validation. Facilitated processing and provided psychoeducation. Recommended higher level of care due to severity of depression and intensity of ideation. Discussed levels of care (partial hospitalization vs. Day treatment vs. IOP vs. DBT). Yuki felt more comfortable with one-on-one therapy at this time. Still encouraged her to think and look into what DBT is for future consideration.     Attributed ideation to starting fluoxetine medication. Instructed at ED to keep taking medication as prescribed by PCP. She is still taking the 10 mg dose with plans to increase to 20 mg after one week. Offered earlier visit with another provider or psychiatry. Yuki declined and opted to keep appointment with Dr. Hamilton next Wednesday.     Assessment:   The patient appeared to be active and engaged in today's session and was receptive to " feedback.     Mental Status:   Yuki appeared generally alert and oriented. Dress was casual and appropriate to the weather and occasion. Grooming and hygiene were good. Eye contact was slightly avoidant. Speech was of normal volume and rate and was clear, coherent, and relevant. Mood was depressed with congruent, blunted affect. Thought processes were relevant, logical and goal-directed. Thought content was WNL with no evidence of psychotic or paranoid features. Memory appeared grossly intact. Insight and judgment appeared good and patient exhibited good impulse control during the appointment.     Suicide risk assessment:  Today Yuki Snell reports passive suicidal ideation with active, fleeting thoughts of jumping from a building. In addition, she has notable risk factors for self-harm, including age, history of self-harm, recent preparatory actions, and anxiety. However, risk is mitigated by ability to describe and follow a safety plan, no previous attempts, seeking help when needed, sobriety, good social support, and stable housing. Therefore, based on all available evidence including the factors cited above, she does not appear to be at imminent risk for self-harm, does not meet criteria for a 72-hr hold, and therefore remains appropriate for ongoing outpatient level of care. However, due to degree of symptoms voluntary referral for psychiatry, day treatment or DBT treatment was offered, she declined this offer. Yuki did agree to weekly outpatient individual therapy in lieu of higher level of care. Yuki was provided with the phone number for emergency mental health services and was encouraged to call these local crisis numbers, 911, or visit a local emergency room if thoughts of suicide or homicide were to arise and/or if she were to be in acute distress. The patient agreed to utilize these services as indicated if the need were to arise.    Does the patient appear to be at imminent risk of harm to  self/others at this time? No    The session was necessary to address problematic symptoms of depression that have been interfering with patient's ability to function in various domains.  Ongoing psychotherapy is necessary to stabilize mood and safety, and provide counseling, support, and psychoeducation.     DSM-5 Diagnosis:  Major depression disorder, recurrent, severe  Anxiety (per chart)  PTSD (per chart)    Plan:  1. Follow up in 1 week - 11/30/2020 at 2:00 pm.   2. Routed note to team for update.   3. Work on goals as noted in patient instructions.  4. Utilize crisis resources as needed.    Elinor Edwards, PhD    NOTE: Treatment plan update due when feasible.  Diagnostic assessment update due next visit.

## 2020-11-24 NOTE — PATIENT INSTRUCTIONS
Conner Mirza, it was good talking with you today! I am including the safety plan here in your messages and after visit summary in case you need an extra copy. Please call the clinic if you have any trouble with your medication, to speak to a provider, or if you want to be seen sooner. You're doing a really good job of following this plan when you need to and I am glad that you are feeling a bit better.     I look forward to working together and hope you have a good Thanksgiving this week. Take care and hang in there!    Safety Plan  Step 1: Warning Signs (thoughts, images, mood, situations, behaviors) that a crisis may be developing  1. Losing my appetite to the point that my stomach hurts from hunger  2. More thoughts about hurting myself  3. Worse thoughts about wanting to die    Step 2: Internal coping strategies - Things I can do to take my mind off my problems without contacting another person (relaxation technique, physical activity)  1. Taking my dog for a walk  2. Cooking   3. Playing games with my friends or girlfriend    Step 3: People and social settings that help to distract me  Name: Mom   Name: Gilma  Place: The dog park    Step 4: People whom I can ask for help  Name: Provider - Dr. Hamilton or Dr. Edwards Phone: [phone]  Name: Gilma   Name: Lindsey  Name: Elsy or Roderick (friends)     Step 5: Professionals or agencies I can contract during a crisis  - [facility] Suicide Hotline: 714.820.7946 (suicidal thoughts)  - [facility]: [phone] (psychiatric crisis, but can transport self)  - [facility]: [phone] (psychiatric crisis, but cannot transport self)  -Suicide Prevention Lifeline Phone: 8-328-598-TALK (3099)  -If in immediate danger of harming self/others, call 9-1-1.    Crisis Text Line: Text [place] to 163331. Free support at your fingertips 24/7  People who text [place] to 883651 will be connected with a counselor. Crisis Text Line is available 24 hours a day, seven days a week.    Step 6:  Making the environment safe  1. Cast videos on TV  2. Write down 3 things I'm grateful for in the morning and at night  3. Cuddle with my dog and girlfriend    The one thing that is most important to me and worth living for is:  - Girlfriend, friends, family, dog    Joel [employer]A [employer] CARRION (2008). Suicide Safety Plan Template. Publisher: [facility] Mental Health Program and Suicide Prevention Resource Center

## 2020-11-25 NOTE — PATIENT INSTRUCTIONS
Come back to see me in 2 weeks, can push to 4 weeks if you're able to get an appointment with therapist or other behavioral health professional .

## 2020-11-30 ENCOUNTER — VIRTUAL VISIT (OUTPATIENT)
Dept: PSYCHOLOGY | Facility: CLINIC | Age: 20
End: 2020-11-30
Payer: COMMERCIAL

## 2020-11-30 DIAGNOSIS — F41.1 GAD (GENERALIZED ANXIETY DISORDER): ICD-10-CM

## 2020-11-30 DIAGNOSIS — F33.2 SEVERE EPISODE OF RECURRENT MAJOR DEPRESSIVE DISORDER, WITHOUT PSYCHOTIC FEATURES (H): Primary | ICD-10-CM

## 2020-11-30 PROCEDURE — 90791 PSYCH DIAGNOSTIC EVALUATION: CPT | Mod: GT | Performed by: STUDENT IN AN ORGANIZED HEALTH CARE EDUCATION/TRAINING PROGRAM
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
## 2020-12-01 NOTE — PATIENT INSTRUCTIONS
Safety Plan  Step 1: Warning Signs (thoughts, images, mood, situations, behaviors) that a crisis may be developing  1. Losing my appetite to the point that my stomach hurts from hunger  2. More thoughts about hurting myself  3. Worse thoughts about wanting to die     Step 2: Internal coping strategies - Things I can do to take my mind off my problems without contacting another person (relaxation technique, physical activity)  1. Taking my dog for a walk  2. Cooking   3. Playing games with my friends or girlfriend     Step 3: People and social settings that help to distract me  Name:  Mom      Name:  Gilma  Place: The dog park     Step 4: People whom I can ask for help  Name:  Provider - Dr. Hamilton or Dr. Edwards           Phone: [phone]  Name:  Gilma    Name:  Lindsey  Name: Elsy or Roderick (friends)         Step 5: Professionals or agencies I can contract during a crisis  - [facility] [place] [facility]line: [phone] (suicidal thoughts)  - [facility]: [phone] (psychiatric crisis, but can transport self)  - [facility]: [phone] (psychiatric crisis, but cannot transport self)  -Suicide Prevention Lifeline Phone: 6-280-428-TALK (3559)  -If in immediate danger of harming self/others, call 9-1-1.     Crisis Text Line: Text [place] to 856313. Free support at your fingertips 24/7  People who text [place] to 346483 will be connected with a counselor. Crisis Text Line is available 24 hours a day, seven days a week.     Step 6: Making the environment safe  1. Cast videos on TV  2. Write down 3 things I'm grateful for in the morning and at night  3. Cuddle with my dog and girlfriend     The one thing that is most important to me and worth living for is:  - Girlfriend, friends, mom, dog     [employer]el [employer] (2008). Suicide Safety Plan Template. Publisher: [facility]

## 2020-12-07 ENCOUNTER — VIRTUAL VISIT (OUTPATIENT)
Dept: PSYCHOLOGY | Facility: CLINIC | Age: 20
End: 2020-12-07
Payer: COMMERCIAL

## 2020-12-07 DIAGNOSIS — F41.1 GAD (GENERALIZED ANXIETY DISORDER): ICD-10-CM

## 2020-12-07 DIAGNOSIS — F33.2 SEVERE EPISODE OF RECURRENT MAJOR DEPRESSIVE DISORDER, WITHOUT PSYCHOTIC FEATURES (H): Primary | ICD-10-CM

## 2020-12-07 PROCEDURE — 90832 PSYTX W PT 30 MINUTES: CPT | Mod: 95 | Performed by: STUDENT IN AN ORGANIZED HEALTH CARE EDUCATION/TRAINING PROGRAM

## 2020-12-07 NOTE — PATIENT INSTRUCTIONS
"Treatment Plan    Client's Legal Name: Markel Snell    Client's Preferred Name: Yuki  YOB: 2000  Today's Date: December 7, 2020    Next Treatment Plan Update Due: 03/07/2021  Next Diagnostic Update Due: 11/30/2021    DSM-V Diagnoses:  Major depression disorder, recurrent, severe  Generalized anxiety disorder    Psychosocial/Contextual Factors:   The patient's mental health concerns have been continuing to affect her ability to function in various domains causing clinically significant distress.    PHQ-9 SCORE 2/25/2020 5/12/2020 7/6/2020   PHQ-9 Total Score 15 9 17     JAMISON-7 SCORE 2/25/2020 5/12/2020 7/6/2020   Total Score 13 9 16       PC-PTSD: 0 /4  CAGE AID: 0 /4    Collaboration:  Primary Care Provider: Linda Hamilton    Referral: None at this time - perhaps long-term therapy depending on interest    Anticipated treatment duration: Unknown  Agreed upon meeting frequency: Weekly    Long Term Treatment Goal(s) related to diagnosis / functional impairment:  Goal 1: Yuki will reduce depression, follow safety plan, and learn how to manage distressing thoughts and emotions better.     Steps we will take to achieve your goal:    Yuki will participate in therapy and learn and practice strategies such as distress tolerance, emotional regulation, distraction, behavioral activation, and thought challenging.    Intervention(s):  Therapist will provide support, psychoeducation and homework assignments as needed.    Goal 2: Yuki wants to \"control my frustration with people or with situations - I will overload and I am terrible to everyone that I talk to\".     Steps we will take to achieve your goal:    Yuki will participate in therapy and learn and practice skills such as emotional regulation, thoughts and feeling expression, and communication strategies.    Intervention(s):  Therapist will provide support, psychoeducation and homework assignments as needed.      If you need additional " support and care during times that your therapist or PCP are not available, here are some additional resources for you:    [facility] ([facility] Mobile Response Team):  [phone]   [facility] Multilingual Crisis Line:  [phone]    [facility]: [phone]    (Emergency Psychiatric Evaluation)    Acute Psychiatric Services - [facility] [place]  24 hour crisis walk-in and crisis line  [address]  [place], [place]  [phone]    Crisis Text Line: Text [place] to 017478. Free support at your fingertips 24/7  People who text [place] to 532688 will be connected with a counselor. Crisis Text Line is available 24 hours a day, seven days a week.    If you feel at risk of immediate harm, go directly to the Emergency Department.    Patient has reviewed and agreed to the above plan.    Elinor Edwards, PhD  December 7, 2020    ______________________________    ________  Patient Signature       Date    ______________________________    ________  Provider Signature       Date    ______________________________                ________  Supervisor Co-Signature      Date

## 2020-12-07 NOTE — PROGRESS NOTES
Telemedicine Visit: The patient's condition can be safely assessed and treated via synchronous audio and visual telemedicine encounter.       Reason for Telemedicine Visit: Covid-19 restrictions     Originating Site (Patient Location): Patient's home     Distant Site (Provider Location): Provider Remote Setting     Consent:  The patient/guardian has verbally consented to: the potential risks and benefits of telemedicine (video visit) versus in person care; bill my insurance or make self-payment for services provided; and responsibility for payment of non-covered services.      Mode of Communication:  Video Conference via [employer]     As the provider I attest to compliance with applicable laws and regulations related to telemedicine.     Start time: 2:00 pm  Stop time: 2:30 pm     Emergency contact: Gilma - deoiend - [phone]  Closest Emergency Room: [facility]  Location at time of call: Home    Behavioral Health Progress Note    Client Legal Name:  Markel Snell   Client Preferred Name:  Yuki   Service Type:  Individual  Length of Visit:  30 minutes  Attendees:  patient    Identifying Information and Presenting Problem:  Yuki is a 20 year old American adult who is being seen for problematic symptoms of depression, anxiety, and recent safety concerns and suicide risk.    Treatment Objective(s) Addressed in This Session:  Depressed Mood: Decrease frequency and intensity of feeling down, depressed, hopeless  Decrease thoughts that you'd be better off dead or of suicide / self-harm  Risk / Safety: will develop strategies for more effective management of risk issues and will follow safety plan (in EMR) for more effective management of risk issues    Progress on / Status of Treatment Objective(s) / Homework:  Satisfactory progress     PHQ-9 SCORE 5/12/2020 7/6/2020 12/8/2020   PHQ-9 Total Score MyChart - - 15 (Moderately severe depression)   PHQ-9 Total Score 9 17 15       JAMISON-7 SCORE  "5/12/2020 7/6/2020 12/8/2020   Total Score - - 18 (severe anxiety)   Total Score 9 16 18       Topics Discussed/Interventions Provided:  Yuki reported that she is feeling slightly better. She is disappointed because found out that she will not be able to go to school next semester due to financial barriers. Applied for government assistance through [employer], but not enough. Plans to apply again to start in the Fall 2021 semester. \"Easier if you apply for full-term.\" This did not upset her as much and she \"worked it out properly\". Said she didn't take out on herself or need a long walk to calm down. Dealt through it by talking to friends. Empathized with disappointment and reinforced active coping strategies. Praised her for relying on support from others.     Checked in on SI - \"significantly less\" - once every couple of days, rather than everyday with strong intensity. They are more fleeting and easier to distract from (\"get over them\"). Is following safety plan if needed.     Focused on establishing treatment plan and goals. This took a bit of time. Not sure what she would like to focus on although agreed that improving mood and reducing safety risks would be important. Provided psychotherapy education and information on process. Discussed her interest in psychotherapy. Said she has been scheduling and attending appointments because the hospital and PCP requested this, but she's not sure what she wants to focus on since she's feeling better. Talked about general goals - would like to improve relationships. Also planning to find a job after winter holidays while she waits to enroll in college - \"not fast food.\" Has not actively started searching yet. Agreed on treatment plan for now.    Assessment:   The patient appeared to be active and engaged in today's session and was receptive to feedback.     Mental Status:   Yuki appeared generally alert and oriented. Dress was casual and appropriate to the weather and " "occasion. Grooming and hygiene were good. Eye contact was avoidant. Speech was of normal volume and rate and was clear, coherent, and relevant. Mood was \"better\" with restricted, flat affect. Affect appeared agitated at times - fidgeting and slightly rocking back and forth. Thought processes were relevant, logical and goal-directed. Thought content was WNL with no evidence of psychotic or paranoid features. Denied any active SI/HI or self-harm, intent, or plans. Memory appeared grossly intact. Insight and judgment appeared good and patient exhibited good impulse control during the appointment.     Does the patient appear to be at imminent risk of harm to self/others at this time?  No    The session was necessary to address problematic symptoms of depression and anxiety that have been interfering with patient's ability to function in various domains.  Ongoing psychotherapy is necessary to stabilize mood, improve functioning with daily activities, provide psychoeducation and provide support.    DSM-5 Diagnosis:  Major depression disorder, recurrent, severe.   Generalized anxiety disorder    Plan:  1. Follow up in 1 week.  2. Work on goals as noted in patient instructions.  3. Utilize crisis resources as needed.    Elinor Edwards, PhD    NOTE: Treatment plan update due 03/07/2021.  Diagnostic assessment update due 11/30/2021.    "

## 2020-12-08 ENCOUNTER — VIRTUAL VISIT (OUTPATIENT)
Dept: FAMILY MEDICINE | Facility: CLINIC | Age: 20
End: 2020-12-08
Payer: COMMERCIAL

## 2020-12-08 DIAGNOSIS — F33.2 SEVERE EPISODE OF RECURRENT MAJOR DEPRESSIVE DISORDER, WITHOUT PSYCHOTIC FEATURES (H): ICD-10-CM

## 2020-12-08 DIAGNOSIS — F33.1 MODERATE EPISODE OF RECURRENT MAJOR DEPRESSIVE DISORDER (H): Chronic | ICD-10-CM

## 2020-12-08 PROCEDURE — 99214 OFFICE O/P EST MOD 30 MIN: CPT | Mod: 95 | Performed by: FAMILY MEDICINE

## 2020-12-08 ASSESSMENT — ANXIETY QUESTIONNAIRES
3. WORRYING TOO MUCH ABOUT DIFFERENT THINGS: NEARLY EVERY DAY
GAD7 TOTAL SCORE: 18
GAD7 TOTAL SCORE: 18
2. NOT BEING ABLE TO STOP OR CONTROL WORRYING: NEARLY EVERY DAY
6. BECOMING EASILY ANNOYED OR IRRITABLE: NEARLY EVERY DAY
7. FEELING AFRAID AS IF SOMETHING AWFUL MIGHT HAPPEN: NEARLY EVERY DAY
5. BEING SO RESTLESS THAT IT IS HARD TO SIT STILL: MORE THAN HALF THE DAYS
4. TROUBLE RELAXING: MORE THAN HALF THE DAYS
7. FEELING AFRAID AS IF SOMETHING AWFUL MIGHT HAPPEN: NEARLY EVERY DAY
GAD7 TOTAL SCORE: 18
1. FEELING NERVOUS, ANXIOUS, OR ON EDGE: MORE THAN HALF THE DAYS

## 2020-12-08 ASSESSMENT — PATIENT HEALTH QUESTIONNAIRE - PHQ9
SUM OF ALL RESPONSES TO PHQ QUESTIONS 1-9: 15
10. IF YOU CHECKED OFF ANY PROBLEMS, HOW DIFFICULT HAVE THESE PROBLEMS MADE IT FOR YOU TO DO YOUR WORK, TAKE CARE OF THINGS AT HOME, OR GET ALONG WITH OTHER PEOPLE: VERY DIFFICULT
SUM OF ALL RESPONSES TO PHQ QUESTIONS 1-9: 15

## 2020-12-08 NOTE — TELEPHONE ENCOUNTER
"Request for medication refill: Fluoxetine     Providers if patient needs an appointment and you are willing to give a one month supply please refill for one month and  send a letter/MyChart using \".SMILLIMITEDREFILL\" .smillimited and route chart to \"P SMI \" (Giving one month refill in non controlled medications is strongly recommended before denial)    If refill has been denied, meaning absolutely no refills without visit, please complete the smart phrase \".smirxrefuse\" and route it to the \"P SMI MED REFILLS\"  pool to inform the patient and the pharmacy.    Ami Caldwell, [facility]        "

## 2020-12-08 NOTE — PROGRESS NOTES
"Family Medicine Video Visit Note  Yuki is being evaluated via a billable video visit.           Video Visit Consent     Patient was verbally read the following and verbal consent was obtained.  \"Video visits are billed at different rates depending on your insurance coverage. During this emergency period, for some insurers they may be billed the same as an in-person visit.  Please reach out to your insurance provider with any questions.  If during the course of the call the physician/provider feels a video visit is not appropriate, you will not be charged for this service.\"     (Name person giving consent:  Patient   Date verbal consent given:  12/8/2020  Time verbal consent given:  1:39 PM)    Patient would like the video invitation sent by: Send to e-mail at: [email or web address]      Chief Complaint   Patient presents with     RECHECK     HRT and improved mood     Due to patient being non-English speaking/uses sign language, an  was used for this visit. Only for face-to-face interpretation by an external agency, date and length of interpretation can be found on the scanned worksheet.           HPI     Video Start Time: 3:25 PM    Depression and Anxiety Follow-Up    How are you doing with your depression since your last visit? Improved    How are you doing with your anxiety since your last visit?  No change    Are you having other symptoms that might be associated with depression or anxiety? Yes:  as noted below    Have you had a significant life event? ED visit for increased suicidal ideation     Do you have any concerns with your use of alcohol or other drugs? No    Sleeps about 6 hours per night, no sleep schedule. Does not wake up extremely tired.   No changes in appetite or intake, does not think she's   No headaches or upset stomach, no dry mouth, no changes in libido      PHQ 5/12/2020 7/6/2020 12/8/2020   PHQ-9 Total Score 9 17 15   Q9: Thoughts of better off dead/self-harm past 2 weeks Not " at all Several days Several days   F/U: Thoughts of suicide or self-harm - - Yes   F/U: Self harm-plan - - Yes   F/U: Self-harm action - - Yes   F/U: Safety concerns - - No     JAMISON-7 SCORE 5/12/2020 7/6/2020 12/8/2020   Total Score - - 18 (severe anxiety)   Total Score 9 16 18     C-SSRS ([place]) 12/16/2020   Within the last month, have you wished you were dead or wished you could go to sleep and not wake up? Yes   Within the last month, have you had any actual thoughts of killing yourself? Yes   Within the last month, have you been thinking about how you might do this? Yes   Within the last month, have you had these thoughts and had some intention of acting on them? Yes   Within the last month, have you started to work out or worked out the details of how to kill yourself with the intent to carry out this plan? No   Within the last month, have you ever done anything, started to do anything, or prepared to do anything to end your life? No      Patient Risk Assessment:  Today Yuki reported decreased suicidal ideation. In addition, she has notable risk factors for self-harm, including does not have family in town. However, risk is mitigated by no access to lethal means, describes a safety plan, h/o seeking help when needed, symptom improvement, none to minimal alcohol use , good social support   and seeing therapist. Therefore, based on all available evidence including the factors cited above, she does not appear to be at imminent risk for self-harm, does not meet criteria for escalation of cares.  During previous visits she was provided with the phone number for emergency mental health services and was encouraged to call these local crisis numbers, 911, or visit a local emergency room if thoughts of suicide or homicide were to arise and/or if she were to be in acute distress. The patient agreed to utilize these services as indicated if the need were to arise.    Discussed the following ways the patient can  remain in a safe environment:  be around others      How many days per week do you miss taking your medication? 0      Current Outpatient Medications   Medication Sig Dispense Refill     estradiol (ESTRACE) 2 MG tablet Take 3 tablets (6 mg) by mouth daily (take 2 tabs in the AM, and 1 tab in the evening) 270 tablet 0     FLUoxetine (PROZAC) 10 MG capsule Take 1 capsule (10 mg) by mouth daily for 7 days, THEN 2 capsules (20 mg) daily for 21 days. 49 capsule 1     fluticasone (FLONASE) 50 MCG/ACT nasal spray Spray 1 spray into both nostrils daily 9.9 mL 1     spironolactone (ALDACTONE) 100 MG tablet Take 1 tablet (100 mg) by mouth daily 90 tablet 0     No Known Allergies           Review of Systems:     Constitutional, HEENT, cardiovascular, pulmonary, gi and gu systems are negative, except as otherwise noted.         Physical Exam:     There were no vitals reported for this visit.    GENERAL: Healthy, alert and no distress  EYES: Eyes grossly normal to inspection.  No discharge or erythema, or obvious scleral/conjunctival abnormalities.  RESP: No audible wheeze, cough, or visible cyanosis.  No visible retractions or increased work of breathing.    SKIN: Visible skin clear. No significant rash, abnormal pigmentation or lesions.  NEURO: Cranial nerves grossly intact.  Mentation and speech appropriate for age.  PSYCH: mentation appears normal, judgement and insight intact, appearance well groomed, mood is sad, affect blunted, but smiles often during visit          Assessment and Plan   1. Severe episode of recurrent major depressive disorder, without psychotic features  patient with recent suicidal ideation, sought care at the ED. Feels well supported at home, and her mood has improved, has continued to take the medication and reports no side effects. Currently reports suicidal thoughts, but no plan, and she knows to call 911 or present to the ED if thoughts were to worsen, or she had a plan to hurt herself. We will  continue on current fluoxetine dose, follow-up with behavioral health as scheduled, and with writer in 1 month.    - FLUoxetine (PROZAC) 20 MG capsule; Take 1 capsule (20 mg) by mouth daily  Dispense: 90 capsule; Refill: 0    Refilled medications that would be required in the next 3 months.     After Visit Information:  Patient chose to view AVS via [facility]      No follow-ups on file.      Video-Visit Details    Type of service:  Video Visit    Video End Time (time video stopped): 3:43 PM    Originating Location (pt. Location): Home    Distant Location (provider location):  [facility]     Platform used for Video Visit: Heladio Hamilton MD  I precepted today with Dr. Medina

## 2020-12-09 ASSESSMENT — ANXIETY QUESTIONNAIRES: GAD7 TOTAL SCORE: 18

## 2020-12-09 ASSESSMENT — PATIENT HEALTH QUESTIONNAIRE - PHQ9: SUM OF ALL RESPONSES TO PHQ QUESTIONS 1-9: 15

## 2020-12-14 ENCOUNTER — VIRTUAL VISIT (OUTPATIENT)
Dept: PSYCHOLOGY | Facility: CLINIC | Age: 20
End: 2020-12-14
Payer: COMMERCIAL

## 2020-12-14 DIAGNOSIS — F33.2 SEVERE EPISODE OF RECURRENT MAJOR DEPRESSIVE DISORDER, WITHOUT PSYCHOTIC FEATURES (H): Primary | ICD-10-CM

## 2020-12-14 DIAGNOSIS — F41.1 GAD (GENERALIZED ANXIETY DISORDER): ICD-10-CM

## 2020-12-14 PROCEDURE — 90834 PSYTX W PT 45 MINUTES: CPT | Mod: U7 | Performed by: STUDENT IN AN ORGANIZED HEALTH CARE EDUCATION/TRAINING PROGRAM

## 2020-12-14 RX ORDER — FLUOXETINE 10 MG/1
CAPSULE ORAL
Qty: 49 CAPSULE | Refills: 1 | OUTPATIENT
Start: 2020-12-14 | End: 2021-01-11

## 2020-12-14 NOTE — PROGRESS NOTES
Telemedicine Visit: The patient's condition can be safely assessed and treated via synchronous audio and visual telemedicine encounter.       Reason for Telemedicine Visit: Covid-19 restrictions     Originating Site (Patient Location): Patient's home     Distant Site (Provider Location): [facility]     Consent:  The patient/guardian has verbally consented to: the potential risks and benefits of telemedicine (video visit) versus in person care; bill my insurance or make self-payment for services provided; and responsibility for payment of non-covered services.      Mode of Communication:  Video Conference via [employer]     As the provider I attest to compliance with applicable laws and regulations related to telemedicine.     Start time: 2:10 pm  Stop time: 2:48 pm     Emergency contact: Gilma - arletfriend - [phone]  Closest Emergency Room: [facility]  Location at time of call: Home     Behavioral Health Progress Note     Client Legal Name:  Markel Snell    Client Preferred Name:  Yuki         Service Type:  Individual  Length of Visit:  38 minutes  Attendees:  patient     Identifying Information and Presenting Problem:  Yuki is a 20 year old American transfemale adult who is being seen for problematic symptoms of depression, anxiety, and recent safety concerns and suicide risk.     Treatment Objective(s) Addressed in This Session:  Depressed Mood: Decrease frequency and intensity of feeling down, depressed, hopeless  Decrease thoughts that you'd be better off dead or of suicide / self-harm  Risk / Safety: will develop strategies for more effective management of risk issues and will follow safety plan (in EMR) for more effective management of risk issues     Progress on / Status of Treatment Objective(s) / Homework:  Satisfactory progress     PHQ-9 SCORE 5/12/2020 7/6/2020 12/8/2020   PHQ-9 Total Score MyChart - - 15 (Moderately severe  "depression)   PHQ-9 Total Score 9 17 15       JAMISON-7 SCORE 5/12/2020 7/6/2020 12/8/2020   Total Score - - 18 (severe anxiety)   Total Score 9 16 18       Topics Discussed/Interventions Provided:  Yuki reported that she is feeling better. Attributed this to ongoing medication, frequent exercise routine, taking dog for walks, and improved appetite and eating. Praised her for these activities and reinforced link between behavioral activation and mood. She reported that her girlfriend is feeling mildly sick and that she is taking care of her as well. Still has occasional thoughts of passive SI, no active thoughts or plans, and these are more fleeting and easier to distract from.     Today we focused on short and long-term goals. Yuki would like to start looking for a job next year - \"anything but fast food.\" Does have a car for transportation. Facilitated exploration of career and interests. She would like to study education and history in college. Interested in becoming an . Explored motivation and interest in this career. Yuki shared experiences with a helpful teacher who helped guide and support her when she needed someone. She also made learning history enjoyable. Discussed how this could guide her job search for a position that could give her more experiences in areas of interest.     Assessment:   The patient appeared to be active and engaged in today's session and was receptive to feedback.      Mental Status:   Yuki appeared generally alert and oriented. Dress was casual and appropriate to the weather and occasion. Grooming and hygiene were good. Eye contact was avoidant. Speech was of normal volume and rate and was clear, coherent, and relevant. Mood was \"better\" with restricted, flat affect. Affect appeared agitated at times - fidgeting and slightly rocking back and forth. Thought processes were relevant, logical and goal-directed. Thought content was WNL with no evidence of " psychotic or paranoid features. Denied any active SI/HI or self-harm, intent, or plans. Memory appeared grossly intact. Insight and judgment appeared good and patient exhibited good impulse control during the appointment.      Does the patient appear to be at imminent risk of harm to self/others at this time?  No     The session was necessary to address problematic symptoms of depression and anxiety that have been interfering with patient's ability to function in various domains.  Ongoing psychotherapy is necessary to stabilize mood, improve functioning with daily activities, provide psychoeducation and provide support.     DSM-5 Diagnosis:  Major depression disorder, recurrent, severe  Generalized anxiety disorder     Plan:  1. Follow up in 3 weeks after the holidays. Patient is familiar with safety plan and has this on hand at home if needed. She agreed to contact clinic or crisis resources if needed.  2. Follow up with PCP as instructed.   3. Utilize crisis resources as needed.     Elinor Edwards, PhD     NOTE: Treatment plan update due 03/07/2021.  Diagnostic assessment update due 11/30/2021.

## 2020-12-16 PROBLEM — H69.93 DYSFUNCTION OF BOTH EUSTACHIAN TUBES: Chronic | Status: ACTIVE | Noted: 2020-07-12

## 2020-12-16 SDOH — SOCIAL STABILITY: SOCIAL NETWORK
DO YOU BELONG TO ANY CLUBS OR ORGANIZATIONS SUCH AS CHURCH GROUPS UNIONS, FRATERNAL OR ATHLETIC GROUPS, OR SCHOOL GROUPS?: NOT ASKED

## 2020-12-16 SDOH — HEALTH STABILITY: PHYSICAL HEALTH: ON AVERAGE, HOW MANY MINUTES DO YOU ENGAGE IN EXERCISE AT THIS LEVEL?: 0 MIN

## 2020-12-16 SDOH — SOCIAL STABILITY: SOCIAL NETWORK: HOW OFTEN DO YOU GET TOGETHER WITH FRIENDS OR RELATIVES?: NOT ASKED

## 2020-12-16 SDOH — SOCIAL STABILITY: SOCIAL NETWORK: IN A TYPICAL WEEK, HOW MANY TIMES DO YOU TALK ON THE PHONE WITH FAMILY, FRIENDS, OR NEIGHBORS?: NOT ASKED

## 2020-12-16 SDOH — SOCIAL STABILITY: SOCIAL NETWORK: HOW OFTEN DO YOU ATTENT MEETINGS OF THE CLUB OR ORGANIZATION YOU BELONG TO?: NOT ASKED

## 2020-12-16 SDOH — HEALTH STABILITY: PHYSICAL HEALTH: ON AVERAGE, HOW MANY DAYS PER WEEK DO YOU ENGAGE IN MODERATE TO STRENUOUS EXERCISE (LIKE A BRISK WALK)?: 0 DAYS

## 2020-12-16 SDOH — SOCIAL STABILITY: SOCIAL NETWORK: HOW OFTEN DO YOU ATTEND CHURCH OR RELIGIOUS SERVICES?: NOT ASKED

## 2020-12-16 ASSESSMENT — COLUMBIA-SUICIDE SEVERITY RATING SCALE - C-SSRS
3. IN THE PAST MONTH, HAVE YOU BEEN THINKING ABOUT HOW YOU MIGHT KILL YOURSELF?: YES
2. IN THE PAST MONTH, HAVE YOU ACTUALLY HAD ANY THOUGHTS OF KILLING YOURSELF?: YES
1. WITHIN THE PAST MONTH, HAVE YOU WISHED YOU WERE DEAD OR WISHED YOU COULD GO TO SLEEP AND NOT WAKE UP?: YES
5. IN THE PAST MONTH, HAVE YOU STARTED TO WORK OUT OR WORKED OUT THE DETAILS OF HOW TO KILL YOURSELF? DO YOU INTEND TO CARRY OUT THIS PLAN?: YES
5. IN THE PAST MONTH, HAVE YOU STARTED TO WORK OUT OR WORKED OUT THE DETAILS OF HOW TO KILL YOURSELF? DO YOU INTEND TO CARRY OUT THIS PLAN?: NO
6. HAVE YOU EVER DONE ANYTHING, STARTED TO DO ANYTHING, OR PREPARED TO DO ANYTHING TO END YOUR LIFE?: NO

## 2020-12-28 DIAGNOSIS — H69.93 DYSFUNCTION OF BOTH EUSTACHIAN TUBES: ICD-10-CM

## 2020-12-28 RX ORDER — FLUTICASONE PROPIONATE 50 MCG
1 SPRAY, SUSPENSION (ML) NASAL DAILY
Qty: 9.9 ML | Refills: 1 | Status: SHIPPED | OUTPATIENT
Start: 2020-12-28

## 2020-12-28 NOTE — TELEPHONE ENCOUNTER
"Request for medication refill:  Fluticasone 50 mcg spray     Providers if patient needs an appointment and you are willing to give a one month supply please refill for one month and  send a letter/MyChart using \".SMILLIMITEDREFILL\" .smillimited and route chart to \"P SMI \" (Giving one month refill in non controlled medications is strongly recommended before denial)    If refill has been denied, meaning absolutely no refills without visit, please complete the smart phrase \".smirxrefuse\" and route it to the \"P SMI MED REFILLS\"  pool to inform the patient and the pharmacy.    Amalia Gr, EMT        "

## 2021-01-04 ENCOUNTER — HEALTH MAINTENANCE LETTER (OUTPATIENT)
Age: 21
End: 2021-01-04

## 2021-02-22 DIAGNOSIS — F64.0 GENDER DYSPHORIA IN ADOLESCENT AND ADULT: Chronic | ICD-10-CM

## 2021-02-22 RX ORDER — ESTRADIOL 2 MG/1
6 TABLET ORAL DAILY
Qty: 270 TABLET | Refills: 0 | Status: SHIPPED | OUTPATIENT
Start: 2021-02-22 | End: 2021-03-12

## 2021-02-22 NOTE — TELEPHONE ENCOUNTER

## 2021-02-26 DIAGNOSIS — F64.0 GENDER DYSPHORIA IN ADOLESCENT AND ADULT: Chronic | ICD-10-CM

## 2021-02-26 RX ORDER — SPIRONOLACTONE 100 MG/1
100 TABLET, FILM COATED ORAL DAILY
Qty: 90 TABLET | Refills: 0 | Status: SHIPPED | OUTPATIENT
Start: 2021-02-26 | End: 2021-03-12

## 2021-02-26 NOTE — TELEPHONE ENCOUNTER

## 2021-03-08 ENCOUNTER — MYC REFILL (OUTPATIENT)
Dept: FAMILY MEDICINE | Facility: CLINIC | Age: 21
End: 2021-03-08

## 2021-03-08 DIAGNOSIS — F33.2 SEVERE EPISODE OF RECURRENT MAJOR DEPRESSIVE DISORDER, WITHOUT PSYCHOTIC FEATURES (H): ICD-10-CM

## 2021-03-08 DIAGNOSIS — F64.0 GENDER DYSPHORIA IN ADOLESCENT AND ADULT: Chronic | ICD-10-CM

## 2021-03-08 RX ORDER — SPIRONOLACTONE 100 MG/1
100 TABLET, FILM COATED ORAL DAILY
Qty: 90 TABLET | Refills: 0 | Status: CANCELLED | OUTPATIENT
Start: 2021-03-08

## 2021-03-09 NOTE — TELEPHONE ENCOUNTER
Approving 90 day refill for fluoxetine 20 mg. Patient to schedule virtual visit for follow-up of mood symptoms.   Linda Hamilton MD

## 2021-03-12 DIAGNOSIS — F64.0 GENDER DYSPHORIA IN ADOLESCENT AND ADULT: Primary | Chronic | ICD-10-CM

## 2021-03-12 RX ORDER — ESTRADIOL 2 MG/1
6 TABLET ORAL DAILY
Qty: 270 TABLET | Refills: 0 | Status: SHIPPED | OUTPATIENT
Start: 2021-03-12 | End: 2021-09-02

## 2021-03-12 RX ORDER — SPIRONOLACTONE 100 MG/1
100 TABLET, FILM COATED ORAL DAILY
Qty: 90 TABLET | Refills: 0 | Status: SHIPPED | OUTPATIENT
Start: 2021-03-12 | End: 2021-06-03

## 2021-03-12 NOTE — TELEPHONE ENCOUNTER
"RN spoke to patient at request of Dr. Hamilton. Patient confirms she has moved to [facility] and is attempting to establish care there. She states she did receive her fluoxetine and has been taking it regularly. Patient states mental health is \"much better\" since her move- she is living with someone and has good support. Denies any thoughts of hurting herself or others.     Patient states she did only get a 45 day supply of her frances and estradiol prior to leaving the state. She is requesting that Dr. Hamilton send 3 month supply if possible to [facility] in [place] (teed up) to cover her until she established with a new provider. Routing to PCP to order if appropriate.   Desi Navarrete RN    "

## 2021-04-25 ENCOUNTER — HEALTH MAINTENANCE LETTER (OUTPATIENT)
Age: 21
End: 2021-04-25

## 2021-06-01 DIAGNOSIS — F33.2 SEVERE EPISODE OF RECURRENT MAJOR DEPRESSIVE DISORDER, WITHOUT PSYCHOTIC FEATURES (H): ICD-10-CM

## 2021-06-01 NOTE — TELEPHONE ENCOUNTER
"Request for medication refill:  FLUoxetine (PROZAC) 20 MG capsule    Providers if patient needs an appointment and you are willing to give a one month supply please refill for one month and  send a letter/MyChart using \".SMILLIMITEDREFILL\" .smillimited and route chart to \"P [facility] \" (Giving one month refill in non controlled medications is strongly recommended before denial)    If refill has been denied, meaning absolutely no refills without visit, please complete the smart phrase \".smirxrefuse\" and route it to the \"P [facility] MED REFILLS\"  pool to inform the patient and the pharmacy.    Jaimie George MA        "

## 2021-06-03 DIAGNOSIS — F64.0 GENDER DYSPHORIA IN ADOLESCENT AND ADULT: Chronic | ICD-10-CM

## 2021-06-03 RX ORDER — SPIRONOLACTONE 100 MG/1
100 TABLET, FILM COATED ORAL DAILY
Qty: 30 TABLET | Refills: 0 | Status: SHIPPED | OUTPATIENT
Start: 2021-06-03 | End: 2021-07-03

## 2021-06-03 NOTE — TELEPHONE ENCOUNTER
"Request for medication refill:  spironolactone (ALDACTONE) 100 MG tablet    Providers if patient needs an appointment and you are willing to give a one month supply please refill for one month and  send a letter/MyChart using \".SMILLIMITEDREFILL\" .smillimited and route chart to \"P [facility] \" (Giving one month refill in non controlled medications is strongly recommended before denial)    If refill has been denied, meaning absolutely no refills without visit, please complete the smart phrase \".smirxrefuse\" and route it to the \"[facility] MED REFILLS\"  pool to inform the patient and the pharmacy.    Jaimie George MA        "

## 2021-06-08 DIAGNOSIS — F64.0 GENDER DYSPHORIA IN ADOLESCENT AND ADULT: Chronic | ICD-10-CM

## 2021-06-08 NOTE — TELEPHONE ENCOUNTER
"Request for medication refill:  spironolactone (ALDACTONE) 100 MG tablet    Providers if patient needs an appointment and you are willing to give a one month supply please refill for one month and  send a letter/MyChart using \".SMILLIMITEDREFILL\" .smillimited and route chart to \"[facility] \" (Giving one month refill in non controlled medications is strongly recommended before denial)    If refill has been denied, meaning absolutely no refills without visit, please complete the smart phrase \".smirxrefuse\" and route it to the \"[facility] MED REFILLS\"  pool to inform the patient and the pharmacy.    Melba Walden      '  "

## 2021-06-09 RX ORDER — SPIRONOLACTONE 100 MG/1
100 TABLET, FILM COATED ORAL DAILY
Qty: 30 TABLET | Refills: 0 | OUTPATIENT
Start: 2021-06-09

## 2021-08-30 DIAGNOSIS — F33.2 SEVERE EPISODE OF RECURRENT MAJOR DEPRESSIVE DISORDER, WITHOUT PSYCHOTIC FEATURES (H): ICD-10-CM

## 2021-08-30 NOTE — TELEPHONE ENCOUNTER
"Request for medication refill:    prozac 20 mg     Providers if patient needs an appointment and you are willing to give a one month supply please refill for one month and  send a letter/MyChart using \".SMILLIMITEDREFILL\" .smillimited and route chart to \"P [facility] \" (Giving one month refill in non controlled medications is strongly recommended before denial)    If refill has been denied, meaning absolutely no refills without visit, please complete the smart phrase \".smirxrefuse\" and route it to the \"P [facility] MED REFILLS\"  pool to inform the patient and the pharmacy.    Shannon Weston, CMA        "

## 2021-10-10 ENCOUNTER — HEALTH MAINTENANCE LETTER (OUTPATIENT)
Age: 21
End: 2021-10-10

## 2022-05-22 ENCOUNTER — HEALTH MAINTENANCE LETTER (OUTPATIENT)
Age: 22
End: 2022-05-22

## 2022-09-18 ENCOUNTER — HEALTH MAINTENANCE LETTER (OUTPATIENT)
Age: 22
End: 2022-09-18

## 2023-06-04 ENCOUNTER — HEALTH MAINTENANCE LETTER (OUTPATIENT)
Age: 23
End: 2023-06-04